# Patient Record
Sex: FEMALE | Race: WHITE | NOT HISPANIC OR LATINO | Employment: UNEMPLOYED | URBAN - METROPOLITAN AREA
[De-identification: names, ages, dates, MRNs, and addresses within clinical notes are randomized per-mention and may not be internally consistent; named-entity substitution may affect disease eponyms.]

---

## 2020-02-20 LAB
ABO GROUP BLD: NORMAL
BLD GP AB SCN SERPL QL: NEGATIVE
HBV SURFACE AG SERPL QL IA: NEGATIVE
HIV 1+2 AB+HIV1 P24 AG SERPL QL IA: NON REACTIVE
RH BLD: POSITIVE
RUBV IGG SERPL IA-ACNC: NORMAL
TREPONEMA PALLIDUM IGG+IGM AB [PRESENCE] IN SERUM OR PLASMA BY IMMUNOASSAY: NON REACTIVE

## 2020-04-08 LAB — GLUCOSE 1H P CHAL SERPL-MCNC: 125 MG/DL

## 2020-05-18 LAB
C TRACH DNA GENITAL QL NAA+PROBE: NEGATIVE
N GONORRHOEA DNA GENITAL QL NAA+PROBE: NORMAL

## 2020-06-17 LAB — STREP GP B DNA PCR: NEGATIVE

## 2020-07-15 ENCOUNTER — HOSPITAL ENCOUNTER (OUTPATIENT)
Facility: MEDICAL CENTER | Age: 25
End: 2020-07-15
Attending: SPECIALIST | Admitting: SPECIALIST
Payer: OTHER MISCELLANEOUS

## 2020-07-15 LAB — COVID ORDER STATUS COVID19: NORMAL

## 2020-07-15 PROCEDURE — C9803 HOPD COVID-19 SPEC COLLECT: HCPCS | Performed by: SPECIALIST

## 2020-07-15 PROCEDURE — U0003 INFECTIOUS AGENT DETECTION BY NUCLEIC ACID (DNA OR RNA); SEVERE ACUTE RESPIRATORY SYNDROME CORONAVIRUS 2 (SARS-COV-2) (CORONAVIRUS DISEASE [COVID-19]), AMPLIFIED PROBE TECHNIQUE, MAKING USE OF HIGH THROUGHPUT TECHNOLOGIES AS DESCRIBED BY CMS-2020-01-R: HCPCS

## 2020-07-15 NOTE — PROGRESS NOTES
Pt here for covid screen. Test performed. Pt given self isolating discharge instructions, verbalizes understanding.

## 2020-07-16 LAB
SARS-COV-2 RNA RESP QL NAA+PROBE: NOTDETECTED
SPECIMEN SOURCE: NORMAL

## 2020-07-17 ENCOUNTER — HOSPITAL ENCOUNTER (INPATIENT)
Facility: MEDICAL CENTER | Age: 25
LOS: 4 days | End: 2020-07-21
Attending: SPECIALIST | Admitting: SPECIALIST
Payer: OTHER MISCELLANEOUS

## 2020-07-17 ENCOUNTER — ANESTHESIA (OUTPATIENT)
Dept: OBGYN | Facility: MEDICAL CENTER | Age: 25
End: 2020-07-17
Payer: OTHER MISCELLANEOUS

## 2020-07-17 ENCOUNTER — ANESTHESIA EVENT (OUTPATIENT)
Dept: OBGYN | Facility: MEDICAL CENTER | Age: 25
End: 2020-07-17
Payer: OTHER MISCELLANEOUS

## 2020-07-17 ENCOUNTER — HOSPITAL ENCOUNTER (OUTPATIENT)
Dept: OBGYN | Facility: MEDICAL CENTER | Age: 25
End: 2020-07-17
Payer: OTHER MISCELLANEOUS

## 2020-07-17 DIAGNOSIS — G89.18 POST-OP PAIN: ICD-10-CM

## 2020-07-17 LAB
BASOPHILS # BLD AUTO: 0.5 % (ref 0–1.8)
BASOPHILS # BLD: 0.04 K/UL (ref 0–0.12)
EOSINOPHIL # BLD AUTO: 0.05 K/UL (ref 0–0.51)
EOSINOPHIL NFR BLD: 0.6 % (ref 0–6.9)
ERYTHROCYTE [DISTWIDTH] IN BLOOD BY AUTOMATED COUNT: 42.7 FL (ref 35.9–50)
HCT VFR BLD AUTO: 34.8 % (ref 37–47)
HGB BLD-MCNC: 11.6 G/DL (ref 12–16)
HOLDING TUBE BB 8507: NORMAL
IMM GRANULOCYTES # BLD AUTO: 0.11 K/UL (ref 0–0.11)
IMM GRANULOCYTES NFR BLD AUTO: 1.4 % (ref 0–0.9)
LYMPHOCYTES # BLD AUTO: 1.73 K/UL (ref 1–4.8)
LYMPHOCYTES NFR BLD: 22.4 % (ref 22–41)
MCH RBC QN AUTO: 29.5 PG (ref 27–33)
MCHC RBC AUTO-ENTMCNC: 33.3 G/DL (ref 33.6–35)
MCV RBC AUTO: 88.5 FL (ref 81.4–97.8)
MONOCYTES # BLD AUTO: 0.7 K/UL (ref 0–0.85)
MONOCYTES NFR BLD AUTO: 9.1 % (ref 0–13.4)
NEUTROPHILS # BLD AUTO: 5.09 K/UL (ref 2–7.15)
NEUTROPHILS NFR BLD: 66 % (ref 44–72)
NRBC # BLD AUTO: 0 K/UL
NRBC BLD-RTO: 0 /100 WBC
PLATELET # BLD AUTO: 164 K/UL (ref 164–446)
PMV BLD AUTO: 9.5 FL (ref 9–12.9)
RBC # BLD AUTO: 3.93 M/UL (ref 4.2–5.4)
WBC # BLD AUTO: 7.7 K/UL (ref 4.8–10.8)

## 2020-07-17 PROCEDURE — 700105 HCHG RX REV CODE 258: Performed by: SPECIALIST

## 2020-07-17 PROCEDURE — 700111 HCHG RX REV CODE 636 W/ 250 OVERRIDE (IP): Performed by: SPECIALIST

## 2020-07-17 PROCEDURE — 10907ZC DRAINAGE OF AMNIOTIC FLUID, THERAPEUTIC FROM PRODUCTS OF CONCEPTION, VIA NATURAL OR ARTIFICIAL OPENING: ICD-10-PCS | Performed by: SPECIALIST

## 2020-07-17 PROCEDURE — 85025 COMPLETE CBC W/AUTO DIFF WBC: CPT

## 2020-07-17 PROCEDURE — 36415 COLL VENOUS BLD VENIPUNCTURE: CPT

## 2020-07-17 PROCEDURE — 700102 HCHG RX REV CODE 250 W/ 637 OVERRIDE(OP): Performed by: SPECIALIST

## 2020-07-17 PROCEDURE — 3E0P7VZ INTRODUCTION OF HORMONE INTO FEMALE REPRODUCTIVE, VIA NATURAL OR ARTIFICIAL OPENING: ICD-10-PCS | Performed by: SPECIALIST

## 2020-07-17 PROCEDURE — 700105 HCHG RX REV CODE 258

## 2020-07-17 PROCEDURE — 700105 HCHG RX REV CODE 258: Performed by: ANESTHESIOLOGY

## 2020-07-17 PROCEDURE — A9270 NON-COVERED ITEM OR SERVICE: HCPCS | Performed by: SPECIALIST

## 2020-07-17 PROCEDURE — 700111 HCHG RX REV CODE 636 W/ 250 OVERRIDE (IP)

## 2020-07-17 PROCEDURE — 770002 HCHG ROOM/CARE - OB PRIVATE (112)

## 2020-07-17 PROCEDURE — 10H07YZ INSERTION OF OTHER DEVICE INTO PRODUCTS OF CONCEPTION, VIA NATURAL OR ARTIFICIAL OPENING: ICD-10-PCS | Performed by: SPECIALIST

## 2020-07-17 RX ORDER — SODIUM CHLORIDE, SODIUM LACTATE, POTASSIUM CHLORIDE, CALCIUM CHLORIDE 600; 310; 30; 20 MG/100ML; MG/100ML; MG/100ML; MG/100ML
INJECTION, SOLUTION INTRAVENOUS
Status: COMPLETED
Start: 2020-07-17 | End: 2020-07-17

## 2020-07-17 RX ORDER — ONDANSETRON 4 MG/1
4 TABLET, ORALLY DISINTEGRATING ORAL EVERY 6 HOURS PRN
Status: DISCONTINUED | OUTPATIENT
Start: 2020-07-17 | End: 2020-07-18

## 2020-07-17 RX ORDER — MISOPROSTOL 200 UG/1
800 TABLET ORAL
Status: DISCONTINUED | OUTPATIENT
Start: 2020-07-17 | End: 2020-07-18 | Stop reason: HOSPADM

## 2020-07-17 RX ORDER — DEXTROSE, SODIUM CHLORIDE, SODIUM LACTATE, POTASSIUM CHLORIDE, AND CALCIUM CHLORIDE 5; .6; .31; .03; .02 G/100ML; G/100ML; G/100ML; G/100ML; G/100ML
INJECTION, SOLUTION INTRAVENOUS CONTINUOUS
Status: DISCONTINUED | OUTPATIENT
Start: 2020-07-17 | End: 2020-07-19

## 2020-07-17 RX ORDER — ONDANSETRON 2 MG/ML
INJECTION INTRAMUSCULAR; INTRAVENOUS
Status: COMPLETED
Start: 2020-07-17 | End: 2020-07-17

## 2020-07-17 RX ORDER — ONDANSETRON 2 MG/ML
4 INJECTION INTRAMUSCULAR; INTRAVENOUS EVERY 6 HOURS PRN
Status: DISCONTINUED | OUTPATIENT
Start: 2020-07-17 | End: 2020-07-18

## 2020-07-17 RX ORDER — SODIUM CHLORIDE, SODIUM LACTATE, POTASSIUM CHLORIDE, CALCIUM CHLORIDE 600; 310; 30; 20 MG/100ML; MG/100ML; MG/100ML; MG/100ML
INJECTION, SOLUTION INTRAVENOUS CONTINUOUS
Status: DISPENSED | OUTPATIENT
Start: 2020-07-17 | End: 2020-07-17

## 2020-07-17 RX ORDER — ROPIVACAINE HYDROCHLORIDE 2 MG/ML
INJECTION, SOLUTION EPIDURAL; INFILTRATION; PERINEURAL
Status: COMPLETED
Start: 2020-07-17 | End: 2020-07-17

## 2020-07-17 RX ORDER — OXYCODONE HYDROCHLORIDE AND ACETAMINOPHEN 5; 325 MG/1; MG/1
1 TABLET ORAL EVERY 4 HOURS PRN
Status: DISCONTINUED | OUTPATIENT
Start: 2020-07-17 | End: 2020-07-18

## 2020-07-17 RX ORDER — SODIUM CHLORIDE, SODIUM LACTATE, POTASSIUM CHLORIDE, CALCIUM CHLORIDE 600; 310; 30; 20 MG/100ML; MG/100ML; MG/100ML; MG/100ML
1000 INJECTION, SOLUTION INTRAVENOUS CONTINUOUS
Status: DISCONTINUED | OUTPATIENT
Start: 2020-07-17 | End: 2020-07-21 | Stop reason: HOSPADM

## 2020-07-17 RX ORDER — IBUPROFEN 600 MG/1
600 TABLET ORAL EVERY 6 HOURS PRN
Status: DISCONTINUED | OUTPATIENT
Start: 2020-07-17 | End: 2020-07-18

## 2020-07-17 RX ADMIN — MISOPROSTOL 25 MCG: 100 TABLET ORAL at 10:10

## 2020-07-17 RX ADMIN — SODIUM CHLORIDE, POTASSIUM CHLORIDE, SODIUM LACTATE AND CALCIUM CHLORIDE: 600; 310; 30; 20 INJECTION, SOLUTION INTRAVENOUS at 14:58

## 2020-07-17 RX ADMIN — FENTANYL CITRATE 100 MCG: 50 INJECTION INTRAMUSCULAR; INTRAVENOUS at 17:16

## 2020-07-17 RX ADMIN — SODIUM CHLORIDE, POTASSIUM CHLORIDE, SODIUM LACTATE AND CALCIUM CHLORIDE: 600; 310; 30; 20 INJECTION, SOLUTION INTRAVENOUS at 17:17

## 2020-07-17 RX ADMIN — ROPIVACAINE HYDROCHLORIDE 200 MG: 2 INJECTION, SOLUTION EPIDURAL; INFILTRATION at 18:05

## 2020-07-17 RX ADMIN — SODIUM CHLORIDE, POTASSIUM CHLORIDE, SODIUM LACTATE AND CALCIUM CHLORIDE 1000 ML: 600; 310; 30; 20 INJECTION, SOLUTION INTRAVENOUS at 19:14

## 2020-07-17 RX ADMIN — SODIUM CHLORIDE, SODIUM GLUCONATE, SODIUM ACETATE, POTASSIUM CHLORIDE AND MAGNESIUM CHLORIDE: 526; 502; 368; 37; 30 INJECTION, SOLUTION INTRAVENOUS at 17:56

## 2020-07-17 RX ADMIN — OXYTOCIN 1 MILLI-UNITS/MIN: 10 INJECTION, SOLUTION INTRAMUSCULAR; INTRAVENOUS at 21:47

## 2020-07-17 SDOH — ECONOMIC STABILITY: FOOD INSECURITY: WITHIN THE PAST 12 MONTHS, YOU WORRIED THAT YOUR FOOD WOULD RUN OUT BEFORE YOU GOT MONEY TO BUY MORE.: PATIENT DECLINED

## 2020-07-17 SDOH — ECONOMIC STABILITY: TRANSPORTATION INSECURITY
IN THE PAST 12 MONTHS, HAS LACK OF TRANSPORTATION KEPT YOU FROM MEETINGS, WORK, OR FROM GETTING THINGS NEEDED FOR DAILY LIVING?: PATIENT DECLINED

## 2020-07-17 SDOH — ECONOMIC STABILITY: FOOD INSECURITY: WITHIN THE PAST 12 MONTHS, THE FOOD YOU BOUGHT JUST DIDN'T LAST AND YOU DIDN'T HAVE MONEY TO GET MORE.: PATIENT DECLINED

## 2020-07-17 SDOH — ECONOMIC STABILITY: TRANSPORTATION INSECURITY
IN THE PAST 12 MONTHS, HAS THE LACK OF TRANSPORTATION KEPT YOU FROM MEDICAL APPOINTMENTS OR FROM GETTING MEDICATIONS?: PATIENT DECLINED

## 2020-07-17 ASSESSMENT — LIFESTYLE VARIABLES
EVER_SMOKED: NEVER
AVERAGE NUMBER OF DAYS PER WEEK YOU HAVE A DRINK CONTAINING ALCOHOL: 0
TOTAL SCORE: 0
ALCOHOL_USE: NO
HOW MANY TIMES IN THE PAST YEAR HAVE YOU HAD 5 OR MORE DRINKS IN A DAY: 0
EVER FELT BAD OR GUILTY ABOUT YOUR DRINKING: NO
HAVE PEOPLE ANNOYED YOU BY CRITICIZING YOUR DRINKING: NO
HAVE YOU EVER FELT YOU SHOULD CUT DOWN ON YOUR DRINKING: NO
DOES PATIENT WANT TO STOP DRINKING: NO
CONSUMPTION TOTAL: NEGATIVE
EVER HAD A DRINK FIRST THING IN THE MORNING TO STEADY YOUR NERVES TO GET RID OF A HANGOVER: NO
TOTAL SCORE: 0
ON A TYPICAL DAY WHEN YOU DRINK ALCOHOL HOW MANY DRINKS DO YOU HAVE: 0
TOTAL SCORE: 0

## 2020-07-17 ASSESSMENT — PATIENT HEALTH QUESTIONNAIRE - PHQ9
1. LITTLE INTEREST OR PLEASURE IN DOING THINGS: NOT AT ALL
SUM OF ALL RESPONSES TO PHQ9 QUESTIONS 1 AND 2: 0
2. FEELING DOWN, DEPRESSED, IRRITABLE, OR HOPELESS: NOT AT ALL

## 2020-07-17 NOTE — PROGRESS NOTES
Komal is a very pleasant 24-year old nullipara ( 1, para 0) who is today 41 weeks and 1 day gestation.  She has had her prenatal care with myself during the course of this pregnancy.  She transferred her prenatal care to myself shortly after she moved here from [place], at which time she was about 25 weeks gestation.  I last saw her when she came to the office 2 days ago, namely on Wednesday, July 15, 2020.  At that time her cervix was found to be long and closed.  Of note, her recent vaginal culture for group B strep came back negative for group B strep.  Also of note, the estimated fetal weight is 3800 g.  She was scheduled for induction of labor this morning at 8:00 in the morning.  She presented to labor and delivery this morning at about 8:00 in the morning and cervical exam at that time revealed that her cervix was long and closed.  She received a dose of Cytotec 25 mcg per vagina at about 1015 this morning.  Within a few hours she began having uterine contractions.  I just checked her cervix and found her cervix to be about 2 cm dilated, 80% effaced, and the station is -1 station.  The fetal heart tracing is category 1.  We will continue electronic fetal monitoring and give analgesia as needed and anticipate an obstetrical delivery.  The patient says that she would perhaps be open to having a labor epidural.  I explained to her that if she does opt for a labor epidural I can then at that time after she has her labor epidural recheck her cervix and perform artificial rupture of membranes.  Gareth Bolden MD

## 2020-07-17 NOTE — PROGRESS NOTES
" EDC - 7/10 EGA - 41    0803 - Pt arrived to labor and delivery for scheduled IOL. Pt placed in room S 221.  0828 - External monitors in place X2. Category I FHT at this time. VSS. Pt reports good FM. No complaints of contractions, ROM or vaginal bleeding. SVE /-1. FOB \"Javy\" at bedside. POC discussed with pt and family members, all questions answered. Dr Bolden updated, orders received.   945 - Admission profile complete. IV started, labs sent.   1015 - cytotec placed per MAR order.   1145 - Dr Bolden updated. Pt may eat lunch  1445 - SVE /1. Pt repositioned to right side due to FHTs, pt educated regarding positioning r/t well being of baby, as FHTs respond positively to ride side lying position. Pt expresses understanding.   1550 - Dr Bolden at bedside. SVE /1. Continue with POC   - Dr Lazcano at bedside to place epidural.    - Test dose given, pt tolerated well. Pt positioned on right side.    - Report to ASHISH Mcghee RN. Dr Bolden at bedside.     "

## 2020-07-17 NOTE — CARE PLAN
Problem: Infection  Goal: Will remain free from infection  Outcome: PROGRESSING AS EXPECTED  Note: VSS, no s/s of infection, afebrile     Problem: Knowledge Deficit  Goal: Knowledge of the prescribed therapeutic regimen will improve  Outcome: PROGRESSING AS EXPECTED  Note: POC discussed, questions answered.

## 2020-07-18 LAB
ERYTHROCYTE [DISTWIDTH] IN BLOOD BY AUTOMATED COUNT: 43.8 FL (ref 35.9–50)
HCT VFR BLD AUTO: 28 % (ref 37–47)
HGB BLD-MCNC: 9.3 G/DL (ref 12–16)
MCH RBC QN AUTO: 30 PG (ref 27–33)
MCHC RBC AUTO-ENTMCNC: 33.2 G/DL (ref 33.6–35)
MCV RBC AUTO: 90.3 FL (ref 81.4–97.8)
PLATELET # BLD AUTO: 134 K/UL (ref 164–446)
PMV BLD AUTO: 9.3 FL (ref 9–12.9)
RBC # BLD AUTO: 3.1 M/UL (ref 4.2–5.4)
WBC # BLD AUTO: 13.7 K/UL (ref 4.8–10.8)

## 2020-07-18 PROCEDURE — 85027 COMPLETE CBC AUTOMATED: CPT

## 2020-07-18 PROCEDURE — 700105 HCHG RX REV CODE 258: Performed by: SPECIALIST

## 2020-07-18 PROCEDURE — 160041 HCHG SURGERY MINUTES - EA ADDL 1 MIN LEVEL 4: Performed by: SPECIALIST

## 2020-07-18 PROCEDURE — A4450 NON-WATERPROOF TAPE: HCPCS | Performed by: SPECIALIST

## 2020-07-18 PROCEDURE — 160002 HCHG RECOVERY MINUTES (STAT): Performed by: SPECIALIST

## 2020-07-18 PROCEDURE — 700112 HCHG RX REV CODE 229: Performed by: SPECIALIST

## 2020-07-18 PROCEDURE — 770002 HCHG ROOM/CARE - OB PRIVATE (112)

## 2020-07-18 PROCEDURE — A9270 NON-COVERED ITEM OR SERVICE: HCPCS | Performed by: SPECIALIST

## 2020-07-18 PROCEDURE — 700102 HCHG RX REV CODE 250 W/ 637 OVERRIDE(OP): Performed by: ANESTHESIOLOGY

## 2020-07-18 PROCEDURE — 160035 HCHG PACU - 1ST 60 MINS PHASE I: Performed by: SPECIALIST

## 2020-07-18 PROCEDURE — A9270 NON-COVERED ITEM OR SERVICE: HCPCS | Performed by: ANESTHESIOLOGY

## 2020-07-18 PROCEDURE — 700111 HCHG RX REV CODE 636 W/ 250 OVERRIDE (IP): Performed by: ANESTHESIOLOGY

## 2020-07-18 PROCEDURE — 700102 HCHG RX REV CODE 250 W/ 637 OVERRIDE(OP)

## 2020-07-18 PROCEDURE — 160029 HCHG SURGERY MINUTES - 1ST 30 MINS LEVEL 4: Performed by: SPECIALIST

## 2020-07-18 PROCEDURE — 160048 HCHG OR STATISTICAL LEVEL 1-5: Performed by: SPECIALIST

## 2020-07-18 PROCEDURE — 700111 HCHG RX REV CODE 636 W/ 250 OVERRIDE (IP): Performed by: SPECIALIST

## 2020-07-18 PROCEDURE — 700111 HCHG RX REV CODE 636 W/ 250 OVERRIDE (IP)

## 2020-07-18 PROCEDURE — 700101 HCHG RX REV CODE 250: Performed by: ANESTHESIOLOGY

## 2020-07-18 PROCEDURE — 36415 COLL VENOUS BLD VENIPUNCTURE: CPT

## 2020-07-18 PROCEDURE — 306288 HCHG RETRACTOR C SECTION LG

## 2020-07-18 PROCEDURE — 303615 HCHG EPIDURAL/SPINAL ANESTHESIA FOR LABOR

## 2020-07-18 PROCEDURE — A9270 NON-COVERED ITEM OR SERVICE: HCPCS

## 2020-07-18 PROCEDURE — 160009 HCHG ANES TIME/MIN: Performed by: SPECIALIST

## 2020-07-18 RX ORDER — KETOROLAC TROMETHAMINE 30 MG/ML
30 INJECTION, SOLUTION INTRAMUSCULAR; INTRAVENOUS EVERY 6 HOURS
Status: CANCELLED | OUTPATIENT
Start: 2020-07-18 | End: 2020-07-19

## 2020-07-18 RX ORDER — LIDOCAINE HYDROCHLORIDE 20 MG/ML
INJECTION, SOLUTION EPIDURAL; INFILTRATION; INTRACAUDAL; PERINEURAL PRN
Status: DISCONTINUED | OUTPATIENT
Start: 2020-07-18 | End: 2020-07-18 | Stop reason: SURG

## 2020-07-18 RX ORDER — HYDROMORPHONE HYDROCHLORIDE 1 MG/ML
0.4 INJECTION, SOLUTION INTRAMUSCULAR; INTRAVENOUS; SUBCUTANEOUS
Status: DISCONTINUED | OUTPATIENT
Start: 2020-07-18 | End: 2020-07-18 | Stop reason: HOSPADM

## 2020-07-18 RX ORDER — OXYCODONE HYDROCHLORIDE 5 MG/1
5 TABLET ORAL EVERY 4 HOURS PRN
Status: DISCONTINUED | OUTPATIENT
Start: 2020-07-18 | End: 2020-07-19

## 2020-07-18 RX ORDER — DIPHENHYDRAMINE HYDROCHLORIDE 50 MG/ML
12.5 INJECTION INTRAMUSCULAR; INTRAVENOUS
Status: DISCONTINUED | OUTPATIENT
Start: 2020-07-18 | End: 2020-07-18 | Stop reason: HOSPADM

## 2020-07-18 RX ORDER — ONDANSETRON 2 MG/ML
4 INJECTION INTRAMUSCULAR; INTRAVENOUS EVERY 6 HOURS PRN
Status: DISCONTINUED | OUTPATIENT
Start: 2020-07-18 | End: 2020-07-19

## 2020-07-18 RX ORDER — OXYCODONE HCL 5 MG/5 ML
10 SOLUTION, ORAL ORAL
Status: COMPLETED | OUTPATIENT
Start: 2020-07-18 | End: 2020-07-18

## 2020-07-18 RX ORDER — MIDAZOLAM HYDROCHLORIDE 1 MG/ML
1 INJECTION INTRAMUSCULAR; INTRAVENOUS
Status: DISCONTINUED | OUTPATIENT
Start: 2020-07-18 | End: 2020-07-18 | Stop reason: HOSPADM

## 2020-07-18 RX ORDER — CITRIC ACID/SODIUM CITRATE 334-500MG
SOLUTION, ORAL ORAL
Status: COMPLETED
Start: 2020-07-18 | End: 2020-07-18

## 2020-07-18 RX ORDER — ROCURONIUM BROMIDE 10 MG/ML
INJECTION, SOLUTION INTRAVENOUS PRN
Status: DISCONTINUED | OUTPATIENT
Start: 2020-07-18 | End: 2020-07-18 | Stop reason: SURG

## 2020-07-18 RX ORDER — DOCUSATE SODIUM 100 MG/1
100 CAPSULE, LIQUID FILLED ORAL 2 TIMES DAILY PRN
Status: DISCONTINUED | OUTPATIENT
Start: 2020-07-18 | End: 2020-07-21 | Stop reason: HOSPADM

## 2020-07-18 RX ORDER — SODIUM CHLORIDE, SODIUM GLUCONATE, SODIUM ACETATE, POTASSIUM CHLORIDE AND MAGNESIUM CHLORIDE 526; 502; 368; 37; 30 MG/100ML; MG/100ML; MG/100ML; MG/100ML; MG/100ML
INJECTION, SOLUTION INTRAVENOUS
Status: DISCONTINUED | OUTPATIENT
Start: 2020-07-17 | End: 2020-07-18 | Stop reason: SURG

## 2020-07-18 RX ORDER — CEFAZOLIN SODIUM 1 G/3ML
INJECTION, POWDER, FOR SOLUTION INTRAMUSCULAR; INTRAVENOUS PRN
Status: DISCONTINUED | OUTPATIENT
Start: 2020-07-18 | End: 2020-07-18 | Stop reason: SURG

## 2020-07-18 RX ORDER — ROPIVACAINE HYDROCHLORIDE 2 MG/ML
INJECTION, SOLUTION EPIDURAL; INFILTRATION; PERINEURAL
Status: COMPLETED
Start: 2020-07-18 | End: 2020-07-18

## 2020-07-18 RX ORDER — HALOPERIDOL 5 MG/ML
1 INJECTION INTRAMUSCULAR
Status: DISCONTINUED | OUTPATIENT
Start: 2020-07-18 | End: 2020-07-18 | Stop reason: HOSPADM

## 2020-07-18 RX ORDER — HYDROMORPHONE HYDROCHLORIDE 1 MG/ML
0.2 INJECTION, SOLUTION INTRAMUSCULAR; INTRAVENOUS; SUBCUTANEOUS
Status: DISCONTINUED | OUTPATIENT
Start: 2020-07-18 | End: 2020-07-18 | Stop reason: HOSPADM

## 2020-07-18 RX ORDER — ONDANSETRON 2 MG/ML
INJECTION INTRAMUSCULAR; INTRAVENOUS PRN
Status: DISCONTINUED | OUTPATIENT
Start: 2020-07-18 | End: 2020-07-18 | Stop reason: SURG

## 2020-07-18 RX ORDER — MEPERIDINE HYDROCHLORIDE 25 MG/ML
12.5 INJECTION INTRAMUSCULAR; INTRAVENOUS; SUBCUTANEOUS
Status: DISCONTINUED | OUTPATIENT
Start: 2020-07-18 | End: 2020-07-18 | Stop reason: HOSPADM

## 2020-07-18 RX ORDER — SODIUM CHLORIDE, SODIUM LACTATE, POTASSIUM CHLORIDE, CALCIUM CHLORIDE 600; 310; 30; 20 MG/100ML; MG/100ML; MG/100ML; MG/100ML
INJECTION, SOLUTION INTRAVENOUS CONTINUOUS
Status: DISCONTINUED | OUTPATIENT
Start: 2020-07-18 | End: 2020-07-21 | Stop reason: HOSPADM

## 2020-07-18 RX ORDER — DIPHENHYDRAMINE HYDROCHLORIDE 50 MG/ML
25 INJECTION INTRAMUSCULAR; INTRAVENOUS EVERY 6 HOURS PRN
Status: DISCONTINUED | OUTPATIENT
Start: 2020-07-18 | End: 2020-07-19

## 2020-07-18 RX ORDER — HYDROMORPHONE HYDROCHLORIDE 1 MG/ML
0.2 INJECTION, SOLUTION INTRAMUSCULAR; INTRAVENOUS; SUBCUTANEOUS
Status: DISCONTINUED | OUTPATIENT
Start: 2020-07-18 | End: 2020-07-19

## 2020-07-18 RX ORDER — HYDRALAZINE HYDROCHLORIDE 20 MG/ML
5 INJECTION INTRAMUSCULAR; INTRAVENOUS
Status: DISCONTINUED | OUTPATIENT
Start: 2020-07-18 | End: 2020-07-18 | Stop reason: HOSPADM

## 2020-07-18 RX ORDER — ACETAMINOPHEN 500 MG
1000 TABLET ORAL EVERY 6 HOURS
Status: COMPLETED | OUTPATIENT
Start: 2020-07-18 | End: 2020-07-19

## 2020-07-18 RX ORDER — OXYCODONE HYDROCHLORIDE 10 MG/1
10 TABLET ORAL EVERY 4 HOURS PRN
Status: DISCONTINUED | OUTPATIENT
Start: 2020-07-18 | End: 2020-07-19

## 2020-07-18 RX ORDER — METHYLERGONOVINE MALEATE 0.2 MG/ML
0.2 INJECTION INTRAVENOUS
Status: DISCONTINUED | OUTPATIENT
Start: 2020-07-18 | End: 2020-07-21 | Stop reason: HOSPADM

## 2020-07-18 RX ORDER — SIMETHICONE 80 MG
80 TABLET,CHEWABLE ORAL 4 TIMES DAILY PRN
Status: DISCONTINUED | OUTPATIENT
Start: 2020-07-18 | End: 2020-07-21 | Stop reason: HOSPADM

## 2020-07-18 RX ORDER — LIDOCAINE HYDROCHLORIDE 10 MG/ML
INJECTION, SOLUTION INFILTRATION; PERINEURAL
Status: COMPLETED
Start: 2020-07-18 | End: 2020-07-18

## 2020-07-18 RX ORDER — OXYCODONE HCL 5 MG/5 ML
SOLUTION, ORAL ORAL
Status: DISPENSED
Start: 2020-07-18 | End: 2020-07-18

## 2020-07-18 RX ORDER — HYDROMORPHONE HYDROCHLORIDE 1 MG/ML
0.4 INJECTION, SOLUTION INTRAMUSCULAR; INTRAVENOUS; SUBCUTANEOUS
Status: DISCONTINUED | OUTPATIENT
Start: 2020-07-18 | End: 2020-07-19

## 2020-07-18 RX ORDER — KETOROLAC TROMETHAMINE 30 MG/ML
30 INJECTION, SOLUTION INTRAMUSCULAR; INTRAVENOUS EVERY 6 HOURS
Status: DISPENSED | OUTPATIENT
Start: 2020-07-18 | End: 2020-07-19

## 2020-07-18 RX ORDER — DIPHENHYDRAMINE HYDROCHLORIDE 50 MG/ML
12.5 INJECTION INTRAMUSCULAR; INTRAVENOUS EVERY 6 HOURS PRN
Status: DISCONTINUED | OUTPATIENT
Start: 2020-07-18 | End: 2020-07-19

## 2020-07-18 RX ORDER — SODIUM CHLORIDE, SODIUM LACTATE, POTASSIUM CHLORIDE, CALCIUM CHLORIDE 600; 310; 30; 20 MG/100ML; MG/100ML; MG/100ML; MG/100ML
INJECTION, SOLUTION INTRAVENOUS PRN
Status: DISCONTINUED | OUTPATIENT
Start: 2020-07-18 | End: 2020-07-21 | Stop reason: HOSPADM

## 2020-07-18 RX ORDER — METOCLOPRAMIDE HYDROCHLORIDE 5 MG/ML
INJECTION INTRAMUSCULAR; INTRAVENOUS
Status: COMPLETED
Start: 2020-07-18 | End: 2020-07-18

## 2020-07-18 RX ORDER — ONDANSETRON 2 MG/ML
4 INJECTION INTRAMUSCULAR; INTRAVENOUS
Status: DISCONTINUED | OUTPATIENT
Start: 2020-07-18 | End: 2020-07-18 | Stop reason: HOSPADM

## 2020-07-18 RX ORDER — OXYCODONE HCL 5 MG/5 ML
5 SOLUTION, ORAL ORAL
Status: COMPLETED | OUTPATIENT
Start: 2020-07-18 | End: 2020-07-18

## 2020-07-18 RX ORDER — CHLOROPROCAINE HYDROCHLORIDE 30 MG/ML
INJECTION, SOLUTION EPIDURAL; INFILTRATION; INTRACAUDAL; PERINEURAL PRN
Status: DISCONTINUED | OUTPATIENT
Start: 2020-07-18 | End: 2020-07-18 | Stop reason: SURG

## 2020-07-18 RX ORDER — KETOROLAC TROMETHAMINE 30 MG/ML
INJECTION, SOLUTION INTRAMUSCULAR; INTRAVENOUS PRN
Status: DISCONTINUED | OUTPATIENT
Start: 2020-07-18 | End: 2020-07-18 | Stop reason: SURG

## 2020-07-18 RX ORDER — HYDROMORPHONE HYDROCHLORIDE 1 MG/ML
0.1 INJECTION, SOLUTION INTRAMUSCULAR; INTRAVENOUS; SUBCUTANEOUS
Status: DISCONTINUED | OUTPATIENT
Start: 2020-07-18 | End: 2020-07-18 | Stop reason: HOSPADM

## 2020-07-18 RX ORDER — MORPHINE SULFATE 0.5 MG/ML
INJECTION, SOLUTION EPIDURAL; INTRATHECAL; INTRAVENOUS PRN
Status: DISCONTINUED | OUTPATIENT
Start: 2020-07-18 | End: 2020-07-18 | Stop reason: SURG

## 2020-07-18 RX ADMIN — ONDANSETRON 4 MG: 2 INJECTION INTRAMUSCULAR; INTRAVENOUS at 11:01

## 2020-07-18 RX ADMIN — MORPHINE SULFATE 1.5 MG: 0.5 INJECTION, SOLUTION EPIDURAL; INTRATHECAL; INTRAVENOUS at 10:25

## 2020-07-18 RX ADMIN — OXYTOCIN 20 ML: 10 INJECTION, SOLUTION INTRAMUSCULAR; INTRAVENOUS at 10:17

## 2020-07-18 RX ADMIN — ROPIVACAINE HYDROCHLORIDE 200 MG: 2 INJECTION, SOLUTION EPIDURAL; INFILTRATION at 08:18

## 2020-07-18 RX ADMIN — ROPIVACAINE HYDROCHLORIDE 200 MG: 2 INJECTION, SOLUTION EPIDURAL; INFILTRATION at 02:06

## 2020-07-18 RX ADMIN — KETOROLAC TROMETHAMINE 30 MG: 30 INJECTION, SOLUTION INTRAMUSCULAR at 21:10

## 2020-07-18 RX ADMIN — CHLOROPROCAINE HYDROCHLORIDE 10 ML: 30 INJECTION, SOLUTION EPIDURAL; INFILTRATION; INTRACAUDAL; PERINEURAL at 09:47

## 2020-07-18 RX ADMIN — PROPOFOL 170 MG: 10 INJECTION, EMULSION INTRAVENOUS at 10:13

## 2020-07-18 RX ADMIN — SODIUM CITRATE AND CITRIC ACID MONOHYDRATE 30 ML: 500; 334 SOLUTION ORAL at 09:29

## 2020-07-18 RX ADMIN — ACETAMINOPHEN 1000 MG: 500 TABLET ORAL at 21:10

## 2020-07-18 RX ADMIN — SUGAMMADEX 200 MG: 100 INJECTION, SOLUTION INTRAVENOUS at 11:09

## 2020-07-18 RX ADMIN — ONDANSETRON 4 MG: 2 INJECTION INTRAMUSCULAR; INTRAVENOUS at 02:56

## 2020-07-18 RX ADMIN — CEFAZOLIN 2.7 G: 330 INJECTION, POWDER, FOR SOLUTION INTRAMUSCULAR; INTRAVENOUS at 10:09

## 2020-07-18 RX ADMIN — SODIUM CHLORIDE, SODIUM LACTATE, POTASSIUM CHLORIDE, CALCIUM CHLORIDE AND DEXTROSE MONOHYDRATE: 5; 600; 310; 30; 20 INJECTION, SOLUTION INTRAVENOUS at 03:21

## 2020-07-18 RX ADMIN — CHLOROPROCAINE HYDROCHLORIDE 3 ML: 30 INJECTION, SOLUTION EPIDURAL; INFILTRATION; INTRACAUDAL; PERINEURAL at 10:00

## 2020-07-18 RX ADMIN — KETOROLAC TROMETHAMINE 30 MG: 30 INJECTION, SOLUTION INTRAMUSCULAR at 15:49

## 2020-07-18 RX ADMIN — OXYTOCIN 1000 ML: 10 INJECTION, SOLUTION INTRAMUSCULAR; INTRAVENOUS at 11:06

## 2020-07-18 RX ADMIN — ROCURONIUM BROMIDE 50 MG: 10 INJECTION, SOLUTION INTRAVENOUS at 10:13

## 2020-07-18 RX ADMIN — SODIUM CHLORIDE, POTASSIUM CHLORIDE, SODIUM LACTATE AND CALCIUM CHLORIDE 1000 ML: 600; 310; 30; 20 INJECTION, SOLUTION INTRAVENOUS at 07:00

## 2020-07-18 RX ADMIN — FAMOTIDINE 20 MG: 10 INJECTION, SOLUTION INTRAVENOUS at 09:30

## 2020-07-18 RX ADMIN — OXYCODONE HYDROCHLORIDE 10 MG: 5 SOLUTION ORAL at 11:41

## 2020-07-18 RX ADMIN — LIDOCAINE HYDROCHLORIDE 40 MG: 20 INJECTION, SOLUTION EPIDURAL; INFILTRATION; INTRACAUDAL at 10:13

## 2020-07-18 RX ADMIN — ACETAMINOPHEN 1000 MG: 500 TABLET ORAL at 15:49

## 2020-07-18 RX ADMIN — KETOROLAC TROMETHAMINE 30 MG: 30 INJECTION, SOLUTION INTRAMUSCULAR at 10:36

## 2020-07-18 RX ADMIN — CHLOROPROCAINE HYDROCHLORIDE 5 ML: 30 INJECTION, SOLUTION EPIDURAL; INFILTRATION; INTRACAUDAL; PERINEURAL at 09:53

## 2020-07-18 RX ADMIN — METOCLOPRAMIDE 10 MG: 5 INJECTION, SOLUTION INTRAMUSCULAR; INTRAVENOUS at 09:29

## 2020-07-18 RX ADMIN — DOCUSATE SODIUM 100 MG: 100 CAPSULE, LIQUID FILLED ORAL at 15:49

## 2020-07-18 ASSESSMENT — PAIN SCALES - GENERAL: PAIN_LEVEL: 0

## 2020-07-18 ASSESSMENT — EDINBURGH POSTNATAL DEPRESSION SCALE (EPDS)
THE THOUGHT OF HARMING MYSELF HAS OCCURRED TO ME: NEVER
I HAVE BEEN SO UNHAPPY THAT I HAVE HAD DIFFICULTY SLEEPING: NOT AT ALL
I HAVE LOOKED FORWARD WITH ENJOYMENT TO THINGS: AS MUCH AS I EVER DID
I HAVE FELT SAD OR MISERABLE: NOT VERY OFTEN
I HAVE BEEN ABLE TO LAUGH AND SEE THE FUNNY SIDE OF THINGS: AS MUCH AS I ALWAYS COULD
I HAVE BEEN ANXIOUS OR WORRIED FOR NO GOOD REASON: HARDLY EVER
I HAVE BEEN SO UNHAPPY THAT I HAVE BEEN CRYING: ONLY OCCASIONALLY
I HAVE FELT SCARED OR PANICKY FOR NO GOOD REASON: NO, NOT MUCH
THINGS HAVE BEEN GETTING ON TOP OF ME: YES, SOMETIMES I HAVEN'T BEEN COPING AS WELL AS USUAL
I HAVE BLAMED MYSELF UNNECESSARILY WHEN THINGS WENT WRONG: NO, NEVER

## 2020-07-18 NOTE — ANESTHESIA QCDR
2019 [facility] Clinical Data Registry (for Quality Improvement)     Postoperative nausea/vomiting risk protocol (Adult = 18 yrs and Pediatric 3-17 yrs)- (430 and 463)  General inhalation anesthetic (NOT TIVA) with PONV risk factors: No  Provision of anti-emetic therapy with at least 2 different classes of agents: N/A  Patient DID NOT receive anti-emetic therapy and reason is documented in Medical Record: N/A    Multimodal Pain Management- (477)  Non-emergent surgery AND patient age >= 18: No  Use of Multimodal Pain Management, two or more drugs and/or interventions, NOT including systemic opioids:   Exception: Documented allergy to multiple classes of analgesics:     Smoking Abstinence (404)  Patient is current smoker (cigarette, pipe, e-cig, marijuanna): No  Elective Surgery:   Abstinence instructions provided prior to day of surgery:   Patient abstained from smoking on day of surgery:     Pre-Op Beta-Blocker in Isolated CABG (44)  Isolated CABG AND patient age >= 18: No  Beta-blocker admin within 24 hours of surgical incision:   Exception:of medical reason(s) for not administering beta blocker within 24 hours prior to surgical incision (e.g., not  indicated,other medical reason):     PACU assessment of acute postoperative pain prior to Anesthesia Care End- Applies to Patients Age = 18- (ABG7)  Initial PACU pain score is which of the following: < 7/10  Patient unable to report pain score: N/A    Post-anesthetic transfer of care checklist/protocol to PACU/ICU- (426 and 427)  Upon conclusion of case, patient transferred to which of the following locations: PACU/Non-ICU  Use of transfer checklist/protocol: Yes  Exclusion: Service Performed in Patient Hospital Room (and thus did not require transfer): N/A  Unplanned admission to ICU related to anesthesia service up through end of PACU care- (MD51)  Unplanned admission to ICU (not initially anticipated at anesthesia start time): No

## 2020-07-18 NOTE — ANESTHESIA POSTPROCEDURE EVALUATION
Patient: Komal Bailey Ponalejandro    Procedure Summary     Date:  20 Room / Location:  [facility] OR 01 / LABOR AND DELIVERY    Anesthesia Start:   Anesthesia Stop:  20 112    Procedure:   SECTION, PRIMARY (Abdomen) Diagnosis:  (failure to descend)    Surgeon:  Gareth Bolden M.D. Responsible Provider:  Pablo Gray M.D.    Anesthesia Type:  epidural ASA Status:  2          Final Anesthesia Type: epidural  Last vitals  BP   Blood Pressure: 125/77    Temp   37.1 °C (98.8 °F)    Pulse   Pulse: 92   Resp   16    SpO2   97 %      Anesthesia Post Evaluation    Patient location during evaluation: floor  Patient participation: complete - patient participated  Level of consciousness: awake and alert  Pain score: 0    Airway patency: patent  Anesthetic complications: no  Cardiovascular status: hemodynamically stable  Respiratory status: acceptable  Hydration status: euvolemic    PONV: none

## 2020-07-18 NOTE — ANESTHESIA TIME REPORT
Anesthesia Start and Stop Event Times     Date Time Event    7/17/2020 1756 Ready for Procedure     1756 Anesthesia Start    7/18/2020 1121 Anesthesia Stop        Responsible Staff  07/17/20 to 07/18/20    Name Role Begin End    Mynor Lazcano M.D. Anesth 1756 0659    Pablo Gray M.D. Anesth 0659 1121        Preop Diagnosis (Free Text):  Pre-op Diagnosis     failure to descend        Preop Diagnosis (Codes):    Post op Diagnosis  Arrested active phase of labor      Premium Reason  E. Weekend    Comments:

## 2020-07-18 NOTE — PROGRESS NOTES
The patient just received her labor epidural, and her labor epidural is functioning well. I just checked her cervix and I found her cervix to be about 4 cm dilated and 70 percent effaced and -2 station. I then performed artificial rupture of membranes and clear amniotic fluid was observed. I then placed an intrauterine pressure catheter. The fetal heart tracing is category one.   Gareth Bolden M.D.

## 2020-07-18 NOTE — ANESTHESIA PROCEDURE NOTES
Epidural Block    Date/Time: 7/17/2020 5:56 PM  Performed by: Mynor Lazcano M.D.  Authorized by: Mynor Lazcano M.D.     Patient Location:  OB  Start Time:  7/17/2020 5:56 PM  Reason for Block: labor analgesia    patient identified, IV checked, site marked, risks and benefits discussed, surgical consent, monitors and equipment checked, pre-op evaluation and timeout performed    Patient Position:  Sitting  Prep: ChloraPrep, patient draped and sterile technique    Monitoring:  Blood pressure, continuous pulse oximetry and heart rate  Approach:  Midline  Location:  L2-L3  Injection Technique:  TIFFANY saline  Skin infiltration:  Lidocaine  Strength:  1%  Dose:  3ml  Needle Type:  Tuohy  Needle Gauge:  17 G  Needle Length:  3.5 in  Loss of resistance::  6  Catheter Size:  19 G  Catheter at Skin Depth:  12

## 2020-07-18 NOTE — H&P
DATE OF PROCEDURE:  2020    IDENTIFICATION:  The patient is a very pleasant 24-year-old nullipara (on   admission  1, para 0) who is today 41 weeks and 1 day gestation.    CHIEF COMPLAINT:  Frequent and painful uterine contractions.    HISTORY OF PRESENT ILLNESS:  The patient has had her prenatal care with myself   during the course of this pregnancy.  Her due date was established as   07/10/2020.  She was admitted to [facility] Labor and   Delivery yesterday morning at about 8:00 in the morning for induction of labor   for postdates.  The estimated fetal weight was 3800 g.  The recent vaginal   culture for group B strep came back negative for group B strep.  On admission   to labor and delivery, her cervix was found to be long and closed and high,   and she received a dose of Cytotec 25 mcg per vagina and within several hours,   she began having contractions and her cervix changed.  I checked her cervix   in the afternoon and found her cervix to be about 2 cm dilated and 80% effaced   and -2 station and the fetal heart tracing was category 1.  She subsequently   received labor epidural, which functioned well.  At about 7:00 p.m., it was   noted that she had just received a labor epidural and that her labor epidural   was functioning well, and I checked her cervix and found her cervix about 4 cm   dilated and 70% effaced and -2 station, and I then at that time, namely at   about 7:00 p.m. yesterday, performed artificial rupture of membranes and clear   amniotic fluid was observed and then I placed intrauterine pressure catheter.    She was started on Pitocin at one point in time.  The Pitocin was continued.    At about midnight, her cervix was approximately 5-6 cm dilated and 80%   effaced and -2 station.  At 10 minutes after 2:00 this morning, her cervix was   8 cm dilated, 90% effaced, and 0 station.  At 3:00 in the morning, her cervix   was still 8 cm dilated, 90% effaced, and 0  station.  At 4:30 this morning,   her cervix was between 9 and 10 cm dilated and completely effaced and 0   station.  By 5:50 in the morning, her cervix was found to be completely   dilated and the station was +1 (at about sometime between 5:00 to 6:00 this   morning).  At about 5:50 this morning, she began pushing with her   contractions.  She pushed for over 3 hours.  The station was right occiput   anterior.  After more than 3 hours of pushing/second stage, the decision was   made to try to expedite delivery with the use of the vacuum and I discussed   this with the patient and she agreed.  I explained to her that if I was not   able to accomplish vaginal delivery after 3 attempts with the vacuum that we   would need to proceed with  section and she agreed.  So, the vacuum   was placed at +2 to +3 station, right occiput anterior, secondary to prolonged   second stage.  Three attempts of ____ contractions were made with the vacuum,   but were not successful.  The vacuum was then removed.  I explained to the   patient that I would not recommend continuing to try to attempt vaginal   delivery with the vacuum after 3 tries and that I would recommend that we   proceed with  section and she agreed.  I discussed with the patient   and explained to the patient what a primary  section is, what a   primary  section involves, along with the risks and benefits and   alternatives, and after this discussion and after I answered her questions,   she said she wished for us to proceed with  section.  We are bringing   her to the operating room for primary  section.    PAST MEDICAL HISTORY:  No medical illnesses other than for a history of   anxiety and depression.    PAST SURGICAL HISTORY:  The patient had a tonsillectomy.    MEDICATIONS:  The patient has been on no medications other than for vitamins   and other than for the medication she has received during this    hospitalization.    ALLERGIES:  THE PATIENT SAYS SHE IS ALLERGIC TO BIRTH CONTROL PILLS and has no   other drug allergies.    SOCIAL HISTORY:  The patient denies smoking.  She denies use of alcohol or   illicit drugs.    REVIEW OF SYSTEMS:  GENERAL:  No fevers or chills or sweats.  PULMONARY:  No coughing or wheezing or chest pain or shortness of breath.  CARDIOVASCULAR:  No palpitations or dyspnea or chest pain.  GASTROINTESTINAL:  No nausea, vomiting, diarrhea, constipation.  GENITOURINARY:  The patient has been having frequent painful contractions and   she is feeling some of her contractions despite the epidural.  MUSCULOSKELETAL:  No arthralgias or myalgias other than for hip pain and low   back pain.  NEUROLOGIC:  No headaches or syncope or seizures.    PHYSICAL EXAMINATION:  VITAL SIGNS:  Patient's vital signs are stable and she is afebrile.  GENERAL:  Well-developed, well nourished, no apparent distress.  CHEST AND HEART:  Regular rate and rhythm.  No murmur.  LUNGS:  Clear to auscultation bilaterally.  ABDOMEN:  Gravid, about 9 months' size.  PELVIC:  Cervix is completely dilated and station is about +2 station now.  EXTREMITIES:  No clubbing, cyanosis, or edema other than for mild bilateral   symmetrical lower extremity edema consistent with term pregnancy.  NEUROLOGICAL:  Nonfocal.    ASSESSMENT:  1.  Intrauterine pregnancy at 41 weeks and 1 day gestation.  2.  Failed induction of labor.  3.  Prolonged second stage.  4.  Failed attempt to accomplish delivery with vacuum.    PLAN:  We will proceed with  section.  Please see above.       ____________________________________     Gareth Bolden MD    MED / NTS    DD:  2020 09:39:48  DT:  2020 10:17:21    D#:  [id number]  Job#:  [id number]

## 2020-07-18 NOTE — OR SURGEON
Immediate Post OP Note    PreOp Diagnosis:   1.)  Intrauterine pregnancy at 41 weeks and 1 day gestation.  2.)  Active labor, second stage of labor.  3.)  Prolonged second stage of labor (more than 3 hours of pushing).  4.)  Failed attempt to accomplish vaginal delivery with vacuum (failed attempted vacuum assisted vaginal delivery).  5.)  Failed induction of labor.    PostOp Diagnosis:   1.)  Intrauterine pregnancy at 41 weeks and 1 day gestation.  2.)  Active labor, second stage of labor.  3.)  Prolonged second stage of labor (more than 3 hours of pushing).  4.)  Failed attempt to accomplish vaginal delivery with vacuum (failed attempted vacuum assisted vaginal delivery).  5.)  Failed induction of labor.  6.)  Live term male  with Apgar scores of 7 and 8 at 1 and 5 minutes respectively and  weight of 3,670 grams    Procedure(s):   SECTION, PRIMARY    Surgeon(s):  LORE Anderson M.D.    Anesthesiologist/Type of Anesthesia:  Anesthesiologist: Mynor Lazcano M.D.; Pablo Gray M.D./continuous epidural anesthesia, converted to general endotracheal tube anesthesia.    Surgical Staff:  Circulator: Everton Pnea R.N.  Scrub Person: Elza RUDOLPH&ОЛЕГ Baby  Nurse: Koki Chavez R.N.    Specimens removed if any:  None    Estimated Blood Loss:   Approximately 600 cc.    Findings:   1.)  Live term male  with Apgar scores of 7 and 8 at 1 and 5 minutes respectively and  weight of 3670 grams    Complications:   None        2020 11:38 AM Gareth Bolden M.D.

## 2020-07-18 NOTE — OP REPORT
DATE OF SERVICE:  2020    PREOPERATIVE DIAGNOSES:  1.  Intrauterine pregnancy at 41 weeks and 1 day gestation.  2.  Active labor, second stage of labor.  3.  Prolonged second stage of labor (more than 3 hours of pushing).  4.  Failed attempt to accomplish vaginal delivery with vacuum (failed   attempted vacuum-assisted vaginal delivery).  5.  Failed induction of labor.    POSTOPERATIVE DIAGNOSES:  1.  Intrauterine pregnancy at 41 weeks and 1 day gestation.  2.  Active labor, second stage of labor.  3.  Prolonged second stage of labor (more than 3 hours of pushing).  4.  Failed attempt to accomplish vaginal delivery with vacuum (failed   attempted vacuum-assisted vaginal delivery).  5.  Failed induction of labor.  6.  Live term male  with Apgar scores of 7 and 8 at 1 and 5 minutes   respectively and a  weight of 3670 g.    PROCEDURE:  Primary low transverse  section.    SURGEON:  Gareth Bolden MD    ASSISTANT:  Elizabeth Harmon MD    ANESTHESIA:  Initially epidural, converted to general endotracheal tube   anesthesia.    ANESTHESIOLOGIST:  Pablo Gray MD    FINDINGS:  1.  Live term male  with Apgar scores of 7 and 8 at 1 and 5 minutes   respectively and a  weight of 3670 g.  2.  Normal uterus.    SPECIMENS:  None.    COMPLICATIONS:  None.    ESTIMATED BLOOD LOSS:  Approximately 600 mL.    DESCRIPTION OF PROCEDURE:  After the appropriate consents have been obtained,   the patient was taken to the operating room and given a bolus of local   anesthetic through her epidural catheter.  The patient was prepped and draped   in the dorsal supine position and a Novak catheter was noted to be in place   and draining urine.  Anesthesia was tested and the patient has adequate   anesthesia on the right side of her abdomen, but does not appear to have   anesthesia on the left side of the abdomen.  Despite redosing through her   epidural catheter, the patient continued to feel  sensation/pain on the left   side of her abdomen, but not on the right side of her abdomen.  Because it   appeared at this point that the epidural was one-sided, the decision was made   to proceed with general anesthesia and so general anesthesia was induced.    After general anesthesia was induced, a Pfannenstiel incision was made in the   lower abdomen, using a scalpel and this incision was made a few fingerbreadths   superior to the pubic symphysis.  This incision was continued deeply through   the subcutaneous tissues using a scalpel.  The anterior rectus fascia was   identified and incised transversely with a scalpel and this incision was   extended bilaterally with scissors.  The superior aspect of the fascial   incision was undermined from the underlying rectus muscle with blunt   dissection and scissors.  The inferior aspect of the fascial incision was   undermined from the underlying rectus muscle with blunt dissection and   scissors.  The midline of the rectus muscle was easily identified due to the   diastasis in the midline of the rectus muscle and this separation was   developed and continued superiorly and inferiorly and blunt dissection through   the preperitoneal adipose tissues allows identification of the parietal   peritoneum, which was entered bluntly and this entry was extended/expanded   with blunt dissection.  An Justus O self-retaining retractor was inserted and   setup in the usual fashion and found to provide excellent exposure of the   anterior lower uterine segment.  The anterior lower uterine segment was   incised transversely with a scalpel.  This incision was continued deeply with   blunt dissection with the 's fingers and the intrauterine cavity was   reached and meconium-stained amniotic fluid was noted.  The hysterotomy was   extended bilaterally with blunt dissection.  A hand was placed in the   intrauterine cavity and the 's hand (right hand) was then advanced    around baby's head.  Initially, difficulty was encountered, but with   persistence in the patient, the 's hand was eventually successfully   placed around baby's head and then baby's head was slowly elevated up and out   of the pelvis.  Baby's head was nicely elevated up and out of the pelvis and   then easily delivered through the hysterotomy.  Fundal pressure was used to   easily deliver baby's body.  Baby's nasopharynx was suctioned with a bulb   syringe.  After waiting 30 seconds, the umbilical cord was doubly clamped and   cut.  Baby was then handed to the waiting nurse.  A segment of umbilical cord   was set aside for possible cord gases, which were not obtained because the   Apgar scores were 7 and 8.  The placenta was manually delivered.  The interior   of the uterus was wiped clean of products of conception.  The hysterotomy was   reapproximated with a continuous interlocking suture of #1 chromic and then a   continuous simple suture of #1 chromic was placed over the first layer so   again using #1 chromic, to imbricate the first layer.  A few interrupted   figure-of-eight sutures of #1 chromic were placed in certain sites along the   hysterotomy repair to achieve hemostasis.  The entire length of the   hysterotomy repair was examined and hemostasis was noted to be excellent.  The   Justus O self-retaining retractor was removed and lap sponges were removed   from the peritoneal cavity.  Lap count was reported to be correct at this   time.  The parietal peritoneum is identified and reapproximated with simple   continuous sutures using 3-0 Vicryl.  The rectus muscles were reapproximated   in the midline with placement of a few interrupted mattress sutures of 2-0   chromic.  The wound was carefully inspected and hemostasis was noted to be   excellent.  The anterior rectus fascia was identified and reapproximated with   simple continuous suture using #1 Vicryl.  The wound was copiously irrigated   and  drained and hemostasis was noted to be excellent.  The subcutaneous   tissues were reapproximated with a continuous simple suture of 3-0 Vicryl.    Finally, the skin was reapproximated with placement of many interrupted buried   sutures of 4-0 Monocryl placed in the dermis and at least one such suture was   placed for every 1 cm of length along the entire length of the skin incision.    The skin incision was thus reapproximated nicely in this way.  The procedure   was terminated.  Final lap and needle counts reported to be correct x2 at the   end of the procedure.  The patient tolerated the procedure well and sent to   postanesthesia recovery in stable condition.       ____________________________________     Gareth Bolden MD    MED / NTS    DD:  07/18/2020 11:58:28  DT:  07/18/2020 12:52:19    D#:  [id number]  Job#:  [id number]    cc: MOUNA ESCOBAR MD, Elizabeth Harmon MD

## 2020-07-18 NOTE — PROGRESS NOTES
1900: Assumed care of patient, Report Received from NELY Zamora RN. Prasad, Gestation 41.0.     Patient in bed, awake, RN at bedside, denies pain, pleasant affect. Epidural infusing. XAVIER, REN & Dr. Bolden at Bedside. POC discussed, all questions answered.     Patient would like Javy PARK at bedside for delivery phase. Patient would like skin to skin, FOB to cut cord after pulsating, breast feeding with DBM as necessary.     EFM used, discussed and in place.     Patient and family deny questions regarding care since arriving at [facility]. Dry erase board updated with RN contact information, discussed. Patient and family encouraged to call RN with all questions, concerns and needs.       Dr. Bolden at BS. AROM; clear; IUPC placed. SVE: 4/70/-2. Patient comfortable with epidural at this time. Novak placed; patient tolerated well.     2115: Dr. Bolden on floor; updated on POC & Pt. Status.     2145: FHR Strip reviewed with Dr. Bolden. Orders rec'd to start Pitocin. See MAR.     0000: Dr. Bolden at BS; SVE: 5-6/80/-2.     0210: RN at BS for SVE: 8/90/0. Patient resting with peanut ball. FOB at BS for support. Room setup for delivery.     0300: Pt. Calls out feeling pressure. No change - 8/90/0. Patient feeling nauseous. Meds given - see MAR.      0400: RN at B at BS to assist Pt into hands and knees position in bed. Patient tolerated well. Resting in right wedge with peanut ball.     0430: SVE: 9.5/100/0. Patient assisted into throne position with peanut ball for support.     0500: Patient repositioned into left wedge with peanut ball. Bolus of IV fluids open. See MAR.     0505: Call to Dr. Bolden placed. Updated on patient status.     0540: Pt. C/+1. Pushing efforts started.     0700: Report given to MARIETTA Pena RN.

## 2020-07-19 PROCEDURE — A9270 NON-COVERED ITEM OR SERVICE: HCPCS | Performed by: SPECIALIST

## 2020-07-19 PROCEDURE — 700102 HCHG RX REV CODE 250 W/ 637 OVERRIDE(OP): Performed by: SPECIALIST

## 2020-07-19 PROCEDURE — 770002 HCHG ROOM/CARE - OB PRIVATE (112)

## 2020-07-19 PROCEDURE — 700111 HCHG RX REV CODE 636 W/ 250 OVERRIDE (IP): Performed by: ANESTHESIOLOGY

## 2020-07-19 PROCEDURE — A9270 NON-COVERED ITEM OR SERVICE: HCPCS | Performed by: ANESTHESIOLOGY

## 2020-07-19 PROCEDURE — 700112 HCHG RX REV CODE 229: Performed by: SPECIALIST

## 2020-07-19 PROCEDURE — 700102 HCHG RX REV CODE 250 W/ 637 OVERRIDE(OP): Performed by: OBSTETRICS & GYNECOLOGY

## 2020-07-19 PROCEDURE — A9270 NON-COVERED ITEM OR SERVICE: HCPCS | Performed by: OBSTETRICS & GYNECOLOGY

## 2020-07-19 PROCEDURE — 700102 HCHG RX REV CODE 250 W/ 637 OVERRIDE(OP): Performed by: ANESTHESIOLOGY

## 2020-07-19 RX ORDER — ACETAMINOPHEN 325 MG/1
325 TABLET ORAL EVERY 4 HOURS PRN
Status: DISCONTINUED | OUTPATIENT
Start: 2020-07-19 | End: 2020-07-21 | Stop reason: HOSPADM

## 2020-07-19 RX ORDER — ONDANSETRON 4 MG/1
4 TABLET, ORALLY DISINTEGRATING ORAL EVERY 6 HOURS PRN
Status: DISCONTINUED | OUTPATIENT
Start: 2020-07-19 | End: 2020-07-21 | Stop reason: HOSPADM

## 2020-07-19 RX ORDER — MORPHINE SULFATE 4 MG/ML
4 INJECTION, SOLUTION INTRAMUSCULAR; INTRAVENOUS
Status: DISCONTINUED | OUTPATIENT
Start: 2020-07-19 | End: 2020-07-21 | Stop reason: HOSPADM

## 2020-07-19 RX ORDER — ONDANSETRON 2 MG/ML
4 INJECTION INTRAMUSCULAR; INTRAVENOUS EVERY 6 HOURS PRN
Status: DISCONTINUED | OUTPATIENT
Start: 2020-07-19 | End: 2020-07-21 | Stop reason: HOSPADM

## 2020-07-19 RX ORDER — OXYCODONE AND ACETAMINOPHEN 10; 325 MG/1; MG/1
1 TABLET ORAL EVERY 4 HOURS PRN
Status: DISCONTINUED | OUTPATIENT
Start: 2020-07-19 | End: 2020-07-21 | Stop reason: HOSPADM

## 2020-07-19 RX ORDER — FERROUS SULFATE 325(65) MG
325 TABLET ORAL
Status: DISCONTINUED | OUTPATIENT
Start: 2020-07-19 | End: 2020-07-21 | Stop reason: HOSPADM

## 2020-07-19 RX ORDER — OXYCODONE HYDROCHLORIDE AND ACETAMINOPHEN 5; 325 MG/1; MG/1
1 TABLET ORAL EVERY 4 HOURS PRN
Status: DISCONTINUED | OUTPATIENT
Start: 2020-07-19 | End: 2020-07-21 | Stop reason: HOSPADM

## 2020-07-19 RX ADMIN — OXYCODONE HYDROCHLORIDE AND ACETAMINOPHEN 1 TABLET: 5; 325 TABLET ORAL at 19:54

## 2020-07-19 RX ADMIN — DOCUSATE SODIUM 100 MG: 100 CAPSULE, LIQUID FILLED ORAL at 15:55

## 2020-07-19 RX ADMIN — OXYCODONE HYDROCHLORIDE AND ACETAMINOPHEN 1 TABLET: 5; 325 TABLET ORAL at 15:55

## 2020-07-19 RX ADMIN — ACETAMINOPHEN 1000 MG: 500 TABLET ORAL at 09:43

## 2020-07-19 RX ADMIN — KETOROLAC TROMETHAMINE 30 MG: 30 INJECTION, SOLUTION INTRAMUSCULAR at 03:54

## 2020-07-19 RX ADMIN — ACETAMINOPHEN 1000 MG: 500 TABLET ORAL at 03:54

## 2020-07-19 RX ADMIN — FERROUS SULFATE TAB 325 MG (65 MG ELEMENTAL FE) 325 MG: 325 (65 FE) TAB at 09:43

## 2020-07-19 NOTE — PROGRESS NOTES
Bands checked with MOB FOB infant with LD RN. Unit and clan of care explained and verbalized understanding

## 2020-07-19 NOTE — LACTATION NOTE
@1320 met with POSARAHY, POB state baby has been breastfeeding frequently, MOB reports occasional discomfort related to a shallow latch, reminded of the importance of a deep latch and the damage caused by a shallow latch, POB state baby has voided and stooled, MOB states baby  an hour ago and declines offer for assistance at this time, encouraged frequent breastfeeding attempts, encouraged to offer both breasts at each feeding    Plan:  Ad dewey breastfeeding attempts at least Q 3-4 hours, more often if feeding cues noted  [id number]  Call for assistance as needed    Written and verbal information provided on outpatient breastfeeding assistance available at [facility] after discharge and encouraged to call to schedule consult as needed, informed that [facility] [place] is on hold for the time being but if interested in attending to check the hospital web site for information on when it will resume, zoom meeting information provided as well    Encouraged to call for assistance as needed

## 2020-07-19 NOTE — PROGRESS NOTES
Name:   Komal Carranza   Date/Time:  2020 8:36 AM  Gestational Age:  41w2d  Admit Date:   2020  Admitting Dx:   Pregnancy  Indication for care in labor or delivery    POD# 1 S/P [facility]    S:  Abdominal pain no   Ambulating   Yes-to bathroom  Tolerating PO  yes  Flatus    unknown  Bleeding   Yes-appropriate  Voiding   yes   Dizziness   Yes-improved  Breast feeding  yes  Breast tenderness  no    O:  Pulse: 61  Blood Pressure: 130/64     Temp  Av.6 °C (97.8 °F)  Min: 36.2 °C (97.2 °F)  Max: 37.1 °C (98.8 °F)  Heart: regular rate and rhythm without gallops or murmurs  Lungs: clear bases  Abdomen: flat and tender (approriate for post-op) / bowelsounds present /dressing clean and dry.  Extremities: edema 1+  Catheter: DC'd    Intake/Output Summary (Last 24 hours) at 2020 0836  Last data filed at 2020 0819  Gross per 24 hour   Intake 1517 ml   Output 1950 ml   Net -433 ml     Recent Labs     20  1000 20  1822   WBC 7.7 13.7*   RBC 3.93* 3.10*   HEMOGLOBIN 11.6* 9.3*   HEMATOCRIT 34.8* 28.0*   MCV 88.5 90.3   MCH 29.5 30.0   RDW 42.7 43.8   PLATELETCT 164 134*   MPV 9.5 9.3   NEUTSPOLYS 66.00  --    LYMPHOCYTES 22.40  --    MONOCYTES 9.10  --    EOSINOPHILS 0.60  --    BASOPHILS 0.50  --          A:  POD# 1 S/P LTCS for failure in the 2nd stage, failed VAVD  Stable/progressing well   Iron for anemia  Repeat CBC in AM to follow plt    P:  Routine C/S Postpartum care, continue pain management, encourage ambulation, anticipate DC POD#2-3     Sheng Marx M.D.

## 2020-07-19 NOTE — CARE PLAN
Problem: Communication  Goal: The ability to communicate needs accurately and effectively will improve  Note: Plan of care discussed verbalized understanding       Problem: Safety  Goal: Will remain free from injury  Note: Call light with in reach verbalized understanding of need for calling for assistance   Will continue to assess

## 2020-07-19 NOTE — PROGRESS NOTES
Assessment complete. Fundus firm, lochia light. Pain 4/10, medicated per MAR. Novak in place, dressing CDI. Discussed POC for the night. All questions answered at this time. Call light within reach. Encouraged patient to call with any further questions or concerns.

## 2020-07-19 NOTE — CARE PLAN
Problem: Potential for postpartum infection related to surgical incision, compromised uterine condition, urinary tract or respiratory compromise  Goal: Patient will be afebrile and free from signs and symptoms of infection  Outcome: PROGRESSING AS EXPECTED  Note: No signs or symptoms of infection, VSS, will continue to monitor     Problem: Alteration in comfort related to surgical incision and/or after birth pains  Goal: Patient verbalizes acceptable pain level  Outcome: PROGRESSING AS EXPECTED  Note: Patient states pain is tolerable with the use of scheduled pain medications

## 2020-07-19 NOTE — LACTATION NOTE
This note was copied from a baby's chart.  Assisted with breastfeeding, infant eagerly attached and sustained feeding on left breast with nutritive suck and occasional swallows. Reviewed waking techniques and frequency/duration of feeds with mother as well and positioning and latch techniques. Provided [employer] breastfeeding education card. Plan is to breastfeed on demand at least 8 or more times per 24 hours. Denies questions/concerns. LC to follow up tomorrow.

## 2020-07-20 LAB
BASOPHILS # BLD AUTO: 0.3 % (ref 0–1.8)
BASOPHILS # BLD: 0.03 K/UL (ref 0–0.12)
EOSINOPHIL # BLD AUTO: 0.12 K/UL (ref 0–0.51)
EOSINOPHIL NFR BLD: 1.1 % (ref 0–6.9)
ERYTHROCYTE [DISTWIDTH] IN BLOOD BY AUTOMATED COUNT: 45.1 FL (ref 35.9–50)
HCT VFR BLD AUTO: 27.2 % (ref 37–47)
HGB BLD-MCNC: 8.9 G/DL (ref 12–16)
IMM GRANULOCYTES # BLD AUTO: 0.11 K/UL (ref 0–0.11)
IMM GRANULOCYTES NFR BLD AUTO: 1 % (ref 0–0.9)
LYMPHOCYTES # BLD AUTO: 2.04 K/UL (ref 1–4.8)
LYMPHOCYTES NFR BLD: 18.6 % (ref 22–41)
MCH RBC QN AUTO: 29.8 PG (ref 27–33)
MCHC RBC AUTO-ENTMCNC: 32.7 G/DL (ref 33.6–35)
MCV RBC AUTO: 91 FL (ref 81.4–97.8)
MONOCYTES # BLD AUTO: 0.8 K/UL (ref 0–0.85)
MONOCYTES NFR BLD AUTO: 7.3 % (ref 0–13.4)
NEUTROPHILS # BLD AUTO: 7.89 K/UL (ref 2–7.15)
NEUTROPHILS NFR BLD: 71.7 % (ref 44–72)
NRBC # BLD AUTO: 0 K/UL
NRBC BLD-RTO: 0 /100 WBC
PLATELET # BLD AUTO: 157 K/UL (ref 164–446)
PMV BLD AUTO: 9.7 FL (ref 9–12.9)
RBC # BLD AUTO: 2.99 M/UL (ref 4.2–5.4)
WBC # BLD AUTO: 11 K/UL (ref 4.8–10.8)

## 2020-07-20 PROCEDURE — 700112 HCHG RX REV CODE 229: Performed by: SPECIALIST

## 2020-07-20 PROCEDURE — 85025 COMPLETE CBC W/AUTO DIFF WBC: CPT

## 2020-07-20 PROCEDURE — A9270 NON-COVERED ITEM OR SERVICE: HCPCS | Performed by: OBSTETRICS & GYNECOLOGY

## 2020-07-20 PROCEDURE — 700102 HCHG RX REV CODE 250 W/ 637 OVERRIDE(OP): Performed by: SPECIALIST

## 2020-07-20 PROCEDURE — A9270 NON-COVERED ITEM OR SERVICE: HCPCS | Performed by: SPECIALIST

## 2020-07-20 PROCEDURE — 36415 COLL VENOUS BLD VENIPUNCTURE: CPT

## 2020-07-20 PROCEDURE — 770002 HCHG ROOM/CARE - OB PRIVATE (112)

## 2020-07-20 PROCEDURE — 700102 HCHG RX REV CODE 250 W/ 637 OVERRIDE(OP): Performed by: OBSTETRICS & GYNECOLOGY

## 2020-07-20 RX ORDER — IBUPROFEN 600 MG/1
600 TABLET ORAL EVERY 6 HOURS PRN
Status: DISCONTINUED | OUTPATIENT
Start: 2020-07-20 | End: 2020-07-21 | Stop reason: HOSPADM

## 2020-07-20 RX ADMIN — OXYCODONE HYDROCHLORIDE AND ACETAMINOPHEN 1 TABLET: 5; 325 TABLET ORAL at 16:04

## 2020-07-20 RX ADMIN — DOCUSATE SODIUM 100 MG: 100 CAPSULE, LIQUID FILLED ORAL at 08:02

## 2020-07-20 RX ADMIN — OXYCODONE HYDROCHLORIDE AND ACETAMINOPHEN 1 TABLET: 5; 325 TABLET ORAL at 08:02

## 2020-07-20 RX ADMIN — FERROUS SULFATE TAB 325 MG (65 MG ELEMENTAL FE) 325 MG: 325 (65 FE) TAB at 08:02

## 2020-07-20 RX ADMIN — OXYCODONE HYDROCHLORIDE AND ACETAMINOPHEN 1 TABLET: 5; 325 TABLET ORAL at 04:17

## 2020-07-20 RX ADMIN — OXYCODONE HYDROCHLORIDE AND ACETAMINOPHEN 1 TABLET: 5; 325 TABLET ORAL at 12:24

## 2020-07-20 RX ADMIN — OXYCODONE HYDROCHLORIDE AND ACETAMINOPHEN 1 TABLET: 5; 325 TABLET ORAL at 00:12

## 2020-07-20 RX ADMIN — OXYCODONE HYDROCHLORIDE AND ACETAMINOPHEN 1 TABLET: 5; 325 TABLET ORAL at 20:17

## 2020-07-20 NOTE — PROGRESS NOTES
Assessment complete. Fundus firm, lochia light. Pain 4/10, medicated per MAR. Dressing has scant old drainage. Discussed POC for the night. All questions answered at this time. Call light within reach. Encouraged patient to call with any further questions or concerns.

## 2020-07-20 NOTE — PROGRESS NOTES
Post op day # 2  The patient says that she does have some soreness.   She says that she feels fine otherwise.   She says that she has been ambulating and urinating and tolerating a regular diet and passing flatus.   The patient's vital signs are stable and she has been afebrile.   She appears well developed and well nourished and relaxed and alert and comfortable and in no apparent distress.   Labs: the patient's hemoglobin this morning was 8.9 mg per deciliter.   Will plan on discharge home tomorrow.   Gareth Bolden M.D.

## 2020-07-20 NOTE — PROGRESS NOTES
A bedside report received from Esther MCCLURE @ the change of shift.  Assumed care. Discussed plan of care especially on managing pain. Assessment done. Medicated for pain. Encouraged to call if with needs. Will check at intervals.

## 2020-07-20 NOTE — CARE PLAN
Problem: Altered physiologic condition related to postoperative  delivery  Goal: Patient physiologically stable as evidenced by normal lochia, palpable uterine involution and vital signs within normal limits  Outcome: PROGRESSING AS EXPECTED  Note: VSS, assessment WNL, will continue to monitor     Problem: Potential knowledge deficit related to lack of understanding of self and  care  Goal: Patient will demonstrate ability to care for self and infant  Outcome: PROGRESSING AS EXPECTED  Note: Patient able to care for self and infant appropriately

## 2020-07-20 NOTE — DISCHARGE PLANNING
Discharge Planning Assessment Post Partum    Reason for Referral: History of anxiety  Address: [address] [place] [address]  Phone: [phone]  Type of Living Situation: living with FOB  Mom Diagnosis: Pregnancy,   Baby Diagnosis: -41 weeks  Primary Language: English    Name of Baby: Benigno Carranza (: 20)  Father of the Baby: Javy Carranza  Involved in baby’s care? Yes  Contact Information: [phone]    Prenatal Care: Yes  Mom's PCP: Unknown  PCP for new baby: Dr. Olivas    Support System: FOB and both parent's families   Coping/Bonding between mother & baby: Yes  Source of Feeding: breast feeding  Supplies for Infant: prepared for infant    Mom's Insurance: Miscellaneous  Baby Covered on Insurance:Yes, [employer]  Mother Employed/School: Not currently  Other children in the home/names & ages: no, 1st baby    Financial Hardship/Income: denies   Mom's Mental status: alert and oriented  Services used prior to admit: none    CPS History: No  Psychiatric History: history of anxiety.  Provided MOB with counseling specializing in  mental health  Domestic Violence History: No  Drug/ETOH History: No    Resources Provided: post partum support and counseling resources and a children and family resource list  Referrals Made: diaper bank referral provided     Clearance for Discharge: Infant is cleared to discharge home with parents

## 2020-07-20 NOTE — LACTATION NOTE
This note was copied from a baby's chart.  Primip Mom is calm and has been feeding baby ad dewey. He is post circumcision yesterday-is rooting and vigorous at breast. Assisted Mom to get deeper latch. She states that she has several [id number] for support. She will continue to feed minimum of 8-10x daily, will wake to feed if greater than 3 hours during the day or 4 hours during the night since previous feeding. Provided written contact info for  Medicine Center and  zoom. Encouraged to call for any additional LC assist if needed.

## 2020-07-21 VITALS
HEART RATE: 80 BPM | RESPIRATION RATE: 18 BRPM | SYSTOLIC BLOOD PRESSURE: 124 MMHG | WEIGHT: 218 LBS | OXYGEN SATURATION: 93 % | HEIGHT: 65 IN | DIASTOLIC BLOOD PRESSURE: 61 MMHG | TEMPERATURE: 97.5 F | BODY MASS INDEX: 36.32 KG/M2

## 2020-07-21 PROBLEM — O48.0 POST-DATES PREGNANCY: Status: ACTIVE | Noted: 2020-07-21

## 2020-07-21 PROBLEM — O61.9 FAILED INDUCTION OF LABOR: Status: ACTIVE | Noted: 2020-07-21

## 2020-07-21 PROCEDURE — 700102 HCHG RX REV CODE 250 W/ 637 OVERRIDE(OP): Performed by: OBSTETRICS & GYNECOLOGY

## 2020-07-21 PROCEDURE — A9270 NON-COVERED ITEM OR SERVICE: HCPCS | Performed by: SPECIALIST

## 2020-07-21 PROCEDURE — 700112 HCHG RX REV CODE 229: Performed by: SPECIALIST

## 2020-07-21 PROCEDURE — A9270 NON-COVERED ITEM OR SERVICE: HCPCS | Performed by: OBSTETRICS & GYNECOLOGY

## 2020-07-21 PROCEDURE — 700102 HCHG RX REV CODE 250 W/ 637 OVERRIDE(OP): Performed by: SPECIALIST

## 2020-07-21 RX ORDER — OXYCODONE HYDROCHLORIDE AND ACETAMINOPHEN 5; 325 MG/1; MG/1
1 TABLET ORAL EVERY 6 HOURS PRN
Qty: 28 TAB | Refills: 0 | Status: SHIPPED | OUTPATIENT
Start: 2020-07-21 | End: 2020-07-28

## 2020-07-21 RX ORDER — FERROUS SULFATE 325(65) MG
325 TABLET ORAL DAILY
Qty: 30 TAB | Refills: 0 | Status: SHIPPED | OUTPATIENT
Start: 2020-07-21

## 2020-07-21 RX ORDER — IBUPROFEN 800 MG/1
800 TABLET ORAL EVERY 8 HOURS PRN
Qty: 30 TAB | Refills: 0 | Status: SHIPPED | OUTPATIENT
Start: 2020-07-21

## 2020-07-21 RX ORDER — DOCUSATE SODIUM 100 MG/1
100 CAPSULE, LIQUID FILLED ORAL 2 TIMES DAILY
Qty: 60 CAP | Refills: 0 | Status: SHIPPED | OUTPATIENT
Start: 2020-07-21

## 2020-07-21 RX ADMIN — IBUPROFEN 600 MG: 600 TABLET ORAL at 01:41

## 2020-07-21 RX ADMIN — OXYCODONE HYDROCHLORIDE AND ACETAMINOPHEN 1 TABLET: 5; 325 TABLET ORAL at 08:05

## 2020-07-21 RX ADMIN — DOCUSATE SODIUM 100 MG: 100 CAPSULE, LIQUID FILLED ORAL at 08:05

## 2020-07-21 RX ADMIN — IBUPROFEN 600 MG: 600 TABLET ORAL at 08:05

## 2020-07-21 RX ADMIN — OXYCODONE HYDROCHLORIDE AND ACETAMINOPHEN 1 TABLET: 5; 325 TABLET ORAL at 01:41

## 2020-07-21 RX ADMIN — FERROUS SULFATE TAB 325 MG (65 MG ELEMENTAL FE) 325 MG: 325 (65 FE) TAB at 08:05

## 2020-07-21 NOTE — PROGRESS NOTES
"1915: call in to pt room because \"baby was not breathing well\". Baby was pink resting on Pt chest with pacifier in his mouth. Assessment done. Baby did sound a little congested and gave a couple snorts while crying. Educated pt and FOB on signs of respiratory distress and encouraged them to call with any questions or concerns. Pt started crying and stated that she is fine but just overwhelmed. Reassured pt that this is all a new learning experience and we are here to help and answer any questions they have. Pt wanted to breastfeed baby. Assisted pt with breastfeeding and then pt was able to latch baby on her own. Call light is within reach and emergency cord explained to pt and FOB.    2015: pt requesting her pain medication, percocet 5 mg, every 4 hours. Pt states her pain at a 2. Pt given her pain medication. Did offer pt to consider taking motrin for lower pain and see if that will help. Pt agreed to this and said for next time.    2045: call placed to Dr. Bolden and received telephone order for Motrin 600 mg PO Q6H PRN.   "

## 2020-07-21 NOTE — PROGRESS NOTES
Post op day # 3  The patient says that other than for soreness she feels fine and has no problems or complaints.   She says that she would like to go home today.   The patient's vital signs are stable and she is afebrile.   She appears well developed and well nourished and relaxed and alert and comfortable and in no apparent distress.   Will discharge home today.  Rx's for percocet and ibuprofen and iron sulfate and stool softener.   Follow up in office in about two weeks for a post op visit.   Gareth Bolden M.D.

## 2020-07-21 NOTE — DISCHARGE INSTRUCTIONS
POSTPARTUM DISCHARGE INSTRUCTIONS FOR MOM    YOB: 1995   Age: 24 y.o.               Admit Date: 2020     Discharge Date: 2020  Attending Doctor:  Gareth Bolden M.D.                  Allergies:  Food    Discharged to home by car. Discharged via wheelchair, hospital escort: Yes.  Special equipment needed: Not Applicable  Belongings with: Personal  Be sure to schedule a follow-up appointment with your primary care doctor or any specialists as instructed.     Discharge Plan:   Diet Plan: Discussed  Activity Level: Discussed  Confirmed Follow up Appointment: Patient to Call and Schedule Appointment  Medication Reconciliation Updated: Yes    REASONS TO CALL YOUR OBSTETRICIAN:  1.   Persistent fever or shaking chills (Temperature higher than 100.4)  2.   Heavy bleeding (soaking more than 1 pad per hour); Passing clots  3.   Foul odor from vagina  4.   Mastitis (Breast infection; breast pain, chills, fever, redness)  5.   Urinary pain, burning or frequency  6.   Abdominal incision infection  7.   Severe depression longer than 24 hours    HAND WASHING  · Prior to handling the baby.  · Before breastfeeding or bottle feeding baby.  · After using the bathroom or changing the baby's diaper.    WOUND CARE  Ask your physician for additional care instructions.  In general:    ·  Incision:      · Keep clean and dry.    · Do NOT lift anything heavier than your baby for up to 6 weeks.    · There should not be any opening or pus.      VAGINAL CARE  · Nothing inside vagina for 6 weeks: no sexual intercourse, tampons or douching.  · Bleeding may continue for 2-4 weeks.  Amount may vary.    · Call your physician for heavy bleeding which means soaking more than 1 pad per hour    BIRTH CONTROL  · It is possible to become pregnant at any time after delivery and while breastfeeding.  · Plan to discuss a method of birth control with your physician at your follow up visit. visit.    DIET AND  "ELIMINATION  · Eating more fiber (bran cereal, fruits, and vegetables) and drinking plenty of fluids will help to avoid constipation.  · Urinary frequency after childbirth is normal.    POSTPARTUM BLUES  During the first few days after birth, you may experience a sense of the \"blues\" which may include impatience, irritability or even crying.  These feeling come and go quickly.  However, as many as 1 in 10 women experience emotional symptoms known as postpartum depression.    Postpartum depression:  May start as early as the second or third day after delivery or take several weeks or months to develop.  Symptoms of \"blues\" are present, but are more intense:  Crying spells; loss of appetite; feelings of hopelessness or loss of control; fear of touching the baby; over concern or no concern at all about the baby; little or no concern about your own appearance/caring for yourself; and/or inability to sleep or excessive sleeping.  Contact your physician if you are experiencing any of these symptoms.    Crisis Hotline:  · [place] Crisis Hotline:  9-813-ZTTYFCF  Or 1-465.878.3746  · [place] Crisis Hotline:  1-836.678.3420  Or 859-786-2902    PREVENTING SHAKEN BABY:  If you are angry or stressed, PUT THE BABY IN THE CRIB, step away, take some deep breaths, and wait until you are calm to care for the baby.  DO NOT SHAKE THE BABY.  You are not alone, call a supporter for help.    · Crisis Call Center 24/7 crisis line 195-675-4089 or 1-109.847.5139  · You can also text them, text \"ANSWER\" to 245669    QUIT SMOKING/TOBACCO USE:  I understand the use of any tobacco products increases my chance of suffering from future heart disease and could cause other illnesses which may shorten my life. Quitting the use of tobacco products is the single most important thing I can do to improve my health. For further information on smoking / tobacco cessation call a Toll Free Quit Line at 1-512.347.7267 (*National Cancer [place]) or " 1-192.562.3866 (American Lung Association) or you can access the web based program at www.lungusa.org.    · [place] Tobacco Users Help Line:  (502) 652-5862       Toll Free: 1-683.219.7093  · Quit Tobacco Program [facility] (807)227-1242    DEPRESSION / SUICIDE RISK:  As you are discharged from this [facility], it is important to learn how to keep safe from harming yourself.    Recognize the warning signs:  · Abrupt changes in personality, positive or negative- including increase in energy   · Giving away possessions  · Change in eating patterns- significant weight changes-  positive or negative  · Change in sleeping patterns- unable to sleep or sleeping all the time   · Unwillingness or inability to communicate  · Depression  · Unusual sadness, discouragement and loneliness  · Talk of wanting to die  · Neglect of personal appearance   · Rebelliousness- reckless behavior  · Withdrawal from people/activities they love  · Confusion- inability to concentrate     If you or a loved one observes any of these behaviors or has concerns about self-harm, here's what you can do:  · Talk about it- your feelings and reasons for harming yourself  · Remove any means that you might use to hurt yourself (examples: pills, rope, extension cords, firearm)  · Get professional help from the community (Mental Health, Substance Abuse, psychological counseling)  · Do not be alone:Call your Safe Contact- someone whom you trust who will be there for you.  · Call your local CRISIS HOTLINE 985-6501 or 020-858-7096  · Call your local Children's Mobile Crisis Response Team [place] (580) 427-3077 or www.[place]  · Call the toll free National Suicide Prevention Hotlines   · National Suicide Prevention Lifeline 612-109-XRBH (0029)  · National Hope Line Network 800-SUICIDE (351-8590)    DISCHARGE SURVEY:  Thank you for choosing [facility].  We hope we provided you with very good care.  You may be  receiving a survey in the mail.  Please fill it out.  Your opinion is valuable to us.    ADDITIONAL EDUCATIONAL MATERIALS GIVEN TO PATIENT:        My signature on this form indicates that:  1.  I have reviewed and understand the above information  2.  My questions regarding this information have been answered to my satisfaction.  3.  I have formulated a plan with my discharge nurse to obtain my prescribed medication for home.

## 2020-07-21 NOTE — PROGRESS NOTES
An update report received from Minda MCCLURE. Assumed care. Discharge instruction for mom and baby discussed. Prescription given and explained. Emphasized the importance of  screening follow-up test. Questions and concerns have been answered. Baby's ID band matches with the patient and FOB.

## 2020-08-30 NOTE — DISCHARGE SUMMARY
DATE OF ADMISSION:  2020    DATE OF DISCHARGE:  2020    ADMISSION DIAGNOSES:  1.  Intrauterine pregnancy at 41 weeks' gestation.  2.  The patient is being admitted for induction of labor for postdates.    DISCHARGE DIAGNOSES:  1.  Intrauterine pregnancy at 41 weeks and 1 day gestation.  2.  Failed induction of labor (failed attempt to accomplish vaginal delivery   with the vacuum).  3.  Prolonged second stage.  4.  Live term male  with Apgar scores of 7 and 8 at one and five   minutes respectively and a  weight of 3670 g.  5.  Postpartum/postoperative anemia.    PROCEDURE:  Primary low transverse  section.    COMPLICATIONS:  None.    HOSPITAL COURSE:  The patient had her prenatal care with myself during the   course of her pregnancy and her due date was established as 07/10/2020.  She   was admitted to [facility] [place] [facility] in the   morning of 2020 at about 8 in the morning for induction of labor for   postdates.  She was 41 weeks' gestation on admission.  The estimated fetal   weight was 3800 g.  Vaginal culture for group B strep had come back negative   for group B strep. She was admitted to labor and delivery and her cervix was   found to be long and closed and high and she received a dose of Cytotec 25 mcg   per vagina on 2020 and within several hours, she began having uterine   contractions and her cervix changed.  I checked her cervix in the afternoon of   the day of admission and found her cervix to be about 2 cm dilated, 80%   effaced, and -2 station and the fetal heart tracing was found to be category   1.  She subsequently received a labor epidural which functioned well.  Then,   at about 7 p.m. in the evening of the day of admission, it was noted that she   had received a labor epidural and her labor epidural was functioning well and   I checked her cervix at that time and found her cervix to be about 4 cm   dilated and 70% effaced  and -2 station and then I did at that time, namely at   about 7 p.m., perform artificial rupture of membranes and clear amniotic fluid   was observed and then I placed an intrauterine pressure catheter.  She was   started on Pitocin at one point in time.  The Pitocin was continued.  At about   midnight, her cervix was about 5-6 cm dilated and 80% effaced and -2 station.    Then, at 10 minutes after 2 in the morning, on 2020, her cervix was   found to be 8 cm dilated, 90% effaced and 0 station.  At about 3 in the   morning, her cervix was still about 8 cm dilated, 90% effaced, and 0 station.    At 4:30 in the morning, her cervix was between 9 and 10 cm dilated and   completely effaced and the station was 0 station.  By 5:50 in the morning, her   cervix was found to be completely dilated and the station was about +1   station.  At about 5:50 in the morning, she began pushing with her   contractions.  She pushed for 3 hours.  The station was right occiput   anterior.  After more than 3 hours of pushing/second stage, the decision was   made to expedite delivery with the use of a vacuum and I discussed this with   the patient and she agreed.  I explained to her that if I was not able to   accomplish vaginal delivery after 3 attempts with the vacuum, then we would   need to proceed with  section and she agreed.  So, the vacuum was   placed at about +2 to +3 station, right occiput anterior, secondary to   prolonged second stage.  Three attempts with the vacuum (3 contractions) were   made, but we were not successful.  The vacuum was then removed.  I explained   to the patient that I recommended after 3 failed attempts with the vacuum that   we proceed with  section and discussed this with her and she agreed.    So she was taken to the operating room where a primary low transverse    section was performed at 41 weeks and 1 day gestation, and she was   delivered of a live term male  with  Apgar scores of 7 and 8 at one and   five minutes respectively and a  weight of 3670 g.  The    section was performed in the morning of 2020.    The patient's postoperative course was uncomplicated except for the fact that   she had postoperative anemia.  Her antepartum hemoglobin was 11.6 g/dL and her   postpartum hemoglobin later in the day of delivery, namely in the evening of   2020 was 9.3 g/dL and then on postpartum day #2/postoperative day #2,   her hemoglobin was 8.9 g/dL.  On 2020, postoperative day #3, the patient   said that she had some soreness and that she felt fine otherwise and had no   other problems or complaints and she was ambulating and urinating and   tolerating a regular diet and said she wanted to go home.  Her vital signs   were stable and she was afebrile and she appeared well developed and well   nourished and relaxed and alert and comfortable and in no apparent distress.    She was discharged home on that day, namely 2020, in stable condition   with prescriptions for Percocet and ibuprofen and iron sulfate and stool   softener.  I asked her to follow up with me in the office in about 2 weeks for   postoperative visit and to call or contact me at any time should she ever   have any problems or questions or complaints and she said that she would do   so.       ____________________________________     Gareth Bolden MD    MED / [facility]    DD:  2020 21:46:54  DT:  2020 22:06:36    D#:  [id number]  Job#:  [id number]

## 2021-03-24 NOTE — LACTATION NOTE
This note was copied from a baby's chart.  @1430 called to room for latch assessment, MOB was breastfeeding baby when LC arrived, deep latch with widely-flanged lips noted, nutritive suck noted however after a few minutes baby fell asleep, educated POB on signs of a nutritive vs non-nutritive suck, educated on signs of a deep latch, POB request pacifier for use because POB state they feel baby often appears to suck for comfort, education provided on pacifier use and importance of not missing signs baby needs to be fed longer/more often, also educated on potential impact on infant breastfeeding, pacifier then provided per POB request, educated on expected feeding frequency and duration, educated on expected urine and stool output    Encouraged to call for assistance as needed   Lot #: [id number] Lot #: [id number]

## 2023-03-13 NOTE — DISCHARGE PLANNING
Birth certificate has been completed.   Brow Lift Text: A midfrontal incision was made medially to the defect to allow access to the tissues just superior to the left eyebrow. Following careful dissection inferiorly in a supraperiosteal plane to the level of the left eyebrow, several 3-0 monocryl sutures were used to resuspend the eyebrow orbicularis oculi muscular unit to the superior frontal bone periosteum. This resulted in an appropriate reapproximation of static eyebrow symmetry and correction of the left brow ptosis.

## 2024-05-07 ENCOUNTER — HOSPITAL ENCOUNTER (OUTPATIENT)
Facility: MEDICAL CENTER | Age: 29
End: 2024-05-07
Attending: NURSE PRACTITIONER
Payer: COMMERCIAL

## 2024-05-07 ENCOUNTER — OFFICE VISIT (OUTPATIENT)
Dept: URGENT CARE | Facility: PHYSICIAN GROUP | Age: 29
End: 2024-05-07
Payer: COMMERCIAL

## 2024-05-07 VITALS
DIASTOLIC BLOOD PRESSURE: 78 MMHG | HEART RATE: 95 BPM | TEMPERATURE: 97.1 F | HEIGHT: 65 IN | WEIGHT: 222.22 LBS | OXYGEN SATURATION: 96 % | BODY MASS INDEX: 37.02 KG/M2 | RESPIRATION RATE: 16 BRPM | SYSTOLIC BLOOD PRESSURE: 112 MMHG

## 2024-05-07 DIAGNOSIS — R39.9 LOWER URINARY TRACT SYMPTOMS (LUTS): ICD-10-CM

## 2024-05-07 DIAGNOSIS — N30.01 ACUTE CYSTITIS WITH HEMATURIA: ICD-10-CM

## 2024-05-07 LAB
APPEARANCE UR: ABNORMAL
BILIRUB UR STRIP-MCNC: ABNORMAL MG/DL
COLOR UR AUTO: YELLOW
GLUCOSE UR STRIP.AUTO-MCNC: ABNORMAL MG/DL
KETONES UR STRIP.AUTO-MCNC: ABNORMAL MG/DL
LEUKOCYTE ESTERASE UR QL STRIP.AUTO: ABNORMAL
NITRITE UR QL STRIP.AUTO: POSITIVE
PH UR STRIP.AUTO: 7 [PH] (ref 5–8)
POCT INT CON NEG: NEGATIVE
POCT INT CON POS: POSITIVE
POCT URINE PREGNANCY TEST: NEGATIVE
PROT UR QL STRIP: 100 MG/DL
RBC UR QL AUTO: ABNORMAL
SP GR UR STRIP.AUTO: 1.02
UROBILINOGEN UR STRIP-MCNC: 0.2 MG/DL

## 2024-05-07 PROCEDURE — 3078F DIAST BP <80 MM HG: CPT | Performed by: NURSE PRACTITIONER

## 2024-05-07 PROCEDURE — 81025 URINE PREGNANCY TEST: CPT | Performed by: NURSE PRACTITIONER

## 2024-05-07 PROCEDURE — 81002 URINALYSIS NONAUTO W/O SCOPE: CPT | Performed by: NURSE PRACTITIONER

## 2024-05-07 PROCEDURE — 99202 OFFICE O/P NEW SF 15 MIN: CPT | Performed by: NURSE PRACTITIONER

## 2024-05-07 PROCEDURE — 3074F SYST BP LT 130 MM HG: CPT | Performed by: NURSE PRACTITIONER

## 2024-05-07 RX ORDER — PHENAZOPYRIDINE HYDROCHLORIDE 200 MG/1
200 TABLET, FILM COATED ORAL 3 TIMES DAILY PRN
Qty: 6 TABLET | Refills: 0 | Status: SHIPPED | OUTPATIENT
Start: 2024-05-07

## 2024-05-07 RX ORDER — CEFDINIR 300 MG/1
300 CAPSULE ORAL 2 TIMES DAILY
Qty: 10 CAPSULE | Refills: 0 | Status: SHIPPED | OUTPATIENT
Start: 2024-05-07 | End: 2024-05-12

## 2024-05-07 RX ORDER — FOLIC ACID 1 MG/1
1 TABLET ORAL DAILY
COMMUNITY

## 2024-05-07 ASSESSMENT — ENCOUNTER SYMPTOMS
VOMITING: 0
DIZZINESS: 0
FEVER: 0
HEADACHES: 0
CHILLS: 0
BACK PAIN: 1
ABDOMINAL PAIN: 1
NAUSEA: 0
FLANK PAIN: 1
DIARRHEA: 0

## 2024-05-07 NOTE — PROGRESS NOTES
Subjective     Komal Carranza is a 28 y.o. female who presents with UTI (X4-5 days, irritation, frequency)            HPI  New problem.  Patient is a 28-year-old female who presents with burning with urination, frequency, and urgency for the past 4 days.  She denies fever but states she did have chills.  She reports back pain and lower abdominal pain as well as blood in her urine.  She has been taking over-the-counter cranberry for her symptoms with mild relief.  Food  Current Outpatient Medications on File Prior to Visit   Medication Sig Dispense Refill    folic acid (FOLVITE) 1 MG Tab Take 1 mg by mouth every day.      ibuprofen (MOTRIN) 800 MG Tab Take 1 Tab by mouth every 8 hours as needed for Moderate Pain. 30 Tab 0    ferrous sulfate 325 (65 Fe) MG tablet Take 1 Tab by mouth every day. 30 Tab 0    docusate sodium (COLACE) 100 MG Cap Take 1 Cap by mouth 2 times a day. 60 Cap 0    Prenatal Vit-Fe Fumarate-FA (PRENATAL PO) Take  by mouth.       No current facility-administered medications on file prior to visit.     Social History     Socioeconomic History    Marital status:      Spouse name: Not on file    Number of children: Not on file    Years of education: Not on file    Highest education level: Not on file   Occupational History    Not on file   Tobacco Use    Smoking status: Never    Smokeless tobacco: Never   Vaping Use    Vaping Use: Never used   Substance and Sexual Activity    Alcohol use: Not Currently    Drug use: Never    Sexual activity: Not on file   Other Topics Concern    Not on file   Social History Narrative    Not on file     Social Determinants of Health     Financial Resource Strain: Not on file   Food Insecurity: Unknown (7/17/2020)    Hunger Vital Sign     Worried About Running Out of Food in the Last Year: Patient declined     Ran Out of Food in the Last Year: Patient declined   Transportation Needs: Unknown (7/17/2020)    PRAPARE - Transportation     Lack of Transportation  "(Medical): Patient declined     Lack of Transportation (Non-Medical): Patient declined   Physical Activity: Not on file   Stress: Not on file   Social Connections: Not on file   Intimate Partner Violence: Not on file   Housing Stability: Not on file     Breast Cancer-related family history is not on file.            Review of Systems   Constitutional:  Negative for chills and fever.   Gastrointestinal:  Positive for abdominal pain. Negative for diarrhea, nausea and vomiting.   Genitourinary:  Positive for dysuria, flank pain, frequency, hematuria and urgency.   Musculoskeletal:  Positive for back pain.   Neurological:  Negative for dizziness and headaches.              Objective     /78 (BP Location: Left arm, Patient Position: Sitting, BP Cuff Size: Large adult)   Pulse 95   Temp 36.2 °C (97.1 °F)   Resp 16   Ht 1.651 m (5' 5\")   Wt 101 kg (222 lb 3.6 oz)   SpO2 96%   BMI 36.98 kg/m²      Physical Exam  Vitals reviewed.   Constitutional:       General: She is not in acute distress.     Appearance: Normal appearance. She is well-developed.   Cardiovascular:      Rate and Rhythm: Normal rate and regular rhythm.      Heart sounds: Normal heart sounds. No murmur heard.  Pulmonary:      Effort: Pulmonary effort is normal. No respiratory distress.      Breath sounds: Normal breath sounds.   Abdominal:      General: Bowel sounds are normal.      Palpations: Abdomen is soft.      Tenderness: There is abdominal tenderness in the suprapubic area. There is no right CVA tenderness or left CVA tenderness.   Musculoskeletal:         General: Normal range of motion.      Comments: Moves all 4 extremities normally   Skin:     General: Skin is warm and dry.   Neurological:      Mental Status: She is alert and oriented to person, place, and time.   Psychiatric:         Behavior: Behavior normal.         Thought Content: Thought content normal.                             Assessment & Plan        1. Acute cystitis with " hematuria  cefdinir (OMNICEF) 300 MG Cap    phenazopyridine (PYRIDIUM) 200 MG Tab      2. Lower urinary tract symptoms (LUTS)  POCT Urinalysis    POCT Pregnancy    URINE CULTURE(NEW)        Urine with positive nitrites/leuks/blood.  Cefdinir/pyridium.  Culture.  Push fluids.  Differential diagnosis, natural history, supportive care, and indications for immediate follow-up were discussed.

## 2024-05-09 LAB
BACTERIA UR CULT: ABNORMAL
SIGNIFICANT IND 70042: ABNORMAL
SITE SITE: ABNORMAL
SOURCE SOURCE: ABNORMAL

## 2024-05-20 ENCOUNTER — TELEPHONE (OUTPATIENT)
Dept: URGENT CARE | Facility: PHYSICIAN GROUP | Age: 29
End: 2024-05-20
Payer: COMMERCIAL

## 2024-05-20 ENCOUNTER — OFFICE VISIT (OUTPATIENT)
Dept: URGENT CARE | Facility: PHYSICIAN GROUP | Age: 29
End: 2024-05-20
Payer: COMMERCIAL

## 2024-05-20 VITALS
TEMPERATURE: 97.8 F | HEIGHT: 65 IN | OXYGEN SATURATION: 99 % | DIASTOLIC BLOOD PRESSURE: 70 MMHG | HEART RATE: 66 BPM | BODY MASS INDEX: 36.65 KG/M2 | WEIGHT: 220 LBS | SYSTOLIC BLOOD PRESSURE: 116 MMHG | RESPIRATION RATE: 17 BRPM

## 2024-05-20 DIAGNOSIS — N30.01 ACUTE CYSTITIS WITH HEMATURIA: ICD-10-CM

## 2024-05-20 LAB
APPEARANCE UR: CLEAR
BILIRUB UR STRIP-MCNC: NORMAL MG/DL
COLOR UR AUTO: YELLOW
GLUCOSE UR STRIP.AUTO-MCNC: NORMAL MG/DL
KETONES UR STRIP.AUTO-MCNC: NORMAL MG/DL
LEUKOCYTE ESTERASE UR QL STRIP.AUTO: NORMAL
NITRITE UR QL STRIP.AUTO: NORMAL
PH UR STRIP.AUTO: 7 [PH] (ref 5–8)
POCT INT CON NEG: NEGATIVE
POCT INT CON POS: POSITIVE
POCT URINE PREGNANCY TEST: NEGATIVE
PROT UR QL STRIP: NORMAL MG/DL
RBC UR QL AUTO: NORMAL
SP GR UR STRIP.AUTO: 1.02
UROBILINOGEN UR STRIP-MCNC: 0.2 MG/DL

## 2024-05-20 PROCEDURE — 81025 URINE PREGNANCY TEST: CPT | Performed by: FAMILY MEDICINE

## 2024-05-20 PROCEDURE — 3074F SYST BP LT 130 MM HG: CPT | Performed by: FAMILY MEDICINE

## 2024-05-20 PROCEDURE — 81002 URINALYSIS NONAUTO W/O SCOPE: CPT | Performed by: FAMILY MEDICINE

## 2024-05-20 PROCEDURE — 3078F DIAST BP <80 MM HG: CPT | Performed by: FAMILY MEDICINE

## 2024-05-20 PROCEDURE — 99213 OFFICE O/P EST LOW 20 MIN: CPT | Performed by: FAMILY MEDICINE

## 2024-05-20 RX ORDER — CEFDINIR 300 MG/1
300 CAPSULE ORAL 2 TIMES DAILY
Qty: 10 CAPSULE | Refills: 0 | Status: SHIPPED | OUTPATIENT
Start: 2024-05-20 | End: 2024-05-25

## 2024-05-20 NOTE — TELEPHONE ENCOUNTER
Komal called the [facility] this morning in follow up from her 05/07/24 visit for UTI. She is still having symptoms. Advised patient that she will need to return for repeat eval. She understood.

## 2024-05-22 ASSESSMENT — ENCOUNTER SYMPTOMS
EYE REDNESS: 0
MYALGIAS: 0
VOMITING: 0
NAUSEA: 0
EYE DISCHARGE: 0
WEIGHT LOSS: 0

## 2024-05-22 NOTE — PROGRESS NOTES
"Subjective     Komal Carranza is a 28 y.o. female who presents with UTI (Symptoms Originally started 3 weeks, UTI symptoms persisting. )            Seen 5/7/2024 with acute cystitis.  Symptoms improved but did not resolve with cefdinir.  No fever.  No flank pain no vaginal symptoms.  No other aggravating or alleviating factors.        Review of Systems   Constitutional:  Negative for malaise/fatigue and weight loss.   Eyes:  Negative for discharge and redness.   Gastrointestinal:  Negative for nausea and vomiting.   Musculoskeletal:  Negative for joint pain and myalgias.   Skin:  Negative for itching and rash.              Objective     /70 (BP Location: Left arm, Patient Position: Sitting, BP Cuff Size: Large adult)   Pulse 66   Temp 36.6 °C (97.8 °F) (Temporal)   Resp 17   Ht 1.651 m (5' 5\") Comment: pt stated  Wt 99.8 kg (220 lb) Comment: pt stated  SpO2 99%   BMI 36.61 kg/m²      Physical Exam  Constitutional:       General: She is not in acute distress.     Appearance: She is well-developed.   HENT:      Head: Normocephalic and atraumatic.   Eyes:      Conjunctiva/sclera: Conjunctivae normal.   Cardiovascular:      Rate and Rhythm: Normal rate and regular rhythm.      Heart sounds: Normal heart sounds. No murmur heard.  Pulmonary:      Effort: Pulmonary effort is normal.      Breath sounds: Normal breath sounds. No wheezing.   Abdominal:      Palpations: Abdomen is soft.      Tenderness: There is no abdominal tenderness. There is no right CVA tenderness or left CVA tenderness.   Skin:     General: Skin is warm and dry.      Findings: No rash.   Neurological:      Mental Status: She is alert.                             Assessment & Plan   UA reviewed.  Pregnancy negative.  Urine culture 5/7/2024 reviewed     1. Acute cystitis with hematuria    - cefdinir (OMNICEF) 300 MG Cap; Take 1 Capsule by mouth 2 times a day for 5 days.  Dispense: 10 Capsule; Refill: 0  - POCT PREGNANCY  - POCT " Urinalysis    Differential diagnosis, natural history, supportive care, and indications for immediate follow-up were discussed.

## 2024-12-12 ENCOUNTER — HOSPITAL ENCOUNTER (OUTPATIENT)
Dept: RADIOLOGY | Facility: MEDICAL CENTER | Age: 29
End: 2024-12-12
Payer: COMMERCIAL

## 2024-12-12 ENCOUNTER — OFFICE VISIT (OUTPATIENT)
Dept: URGENT CARE | Facility: PHYSICIAN GROUP | Age: 29
End: 2024-12-12
Payer: COMMERCIAL

## 2024-12-12 ENCOUNTER — HOSPITAL ENCOUNTER (OUTPATIENT)
Facility: MEDICAL CENTER | Age: 29
End: 2024-12-12
Payer: COMMERCIAL

## 2024-12-12 VITALS
HEART RATE: 107 BPM | WEIGHT: 213 LBS | SYSTOLIC BLOOD PRESSURE: 116 MMHG | HEIGHT: 65 IN | BODY MASS INDEX: 35.49 KG/M2 | TEMPERATURE: 97.7 F | DIASTOLIC BLOOD PRESSURE: 76 MMHG | OXYGEN SATURATION: 94 % | RESPIRATION RATE: 14 BRPM

## 2024-12-12 DIAGNOSIS — N12 PYELONEPHRITIS: ICD-10-CM

## 2024-12-12 DIAGNOSIS — R39.9 UTI SYMPTOMS: ICD-10-CM

## 2024-12-12 DIAGNOSIS — N20.0 RENAL CALCULI: ICD-10-CM

## 2024-12-12 DIAGNOSIS — R10.9 FLANK PAIN: ICD-10-CM

## 2024-12-12 DIAGNOSIS — R30.0 DYSURIA: ICD-10-CM

## 2024-12-12 DIAGNOSIS — R39.9 UTI SYMPTOMS: Primary | ICD-10-CM

## 2024-12-12 LAB
APPEARANCE UR: NORMAL
BILIRUB UR STRIP-MCNC: NEGATIVE MG/DL
COLOR UR AUTO: NORMAL
GLUCOSE UR STRIP.AUTO-MCNC: NEGATIVE MG/DL
KETONES UR STRIP.AUTO-MCNC: NORMAL MG/DL
LEUKOCYTE ESTERASE UR QL STRIP.AUTO: NORMAL
NITRITE UR QL STRIP.AUTO: POSITIVE
PH UR STRIP.AUTO: 5.5 [PH] (ref 5–8)
POCT INT CON NEG: NEGATIVE
POCT INT CON POS: POSITIVE
POCT URINE PREGNANCY TEST: NEGATIVE
PROT UR QL STRIP: NORMAL MG/DL
RBC UR QL AUTO: NORMAL
SP GR UR STRIP.AUTO: 1.01
UROBILINOGEN UR STRIP-MCNC: 0.2 MG/DL

## 2024-12-12 PROCEDURE — 3074F SYST BP LT 130 MM HG: CPT

## 2024-12-12 PROCEDURE — 87186 SC STD MICRODIL/AGAR DIL: CPT

## 2024-12-12 PROCEDURE — 87077 CULTURE AEROBIC IDENTIFY: CPT

## 2024-12-12 PROCEDURE — 99214 OFFICE O/P EST MOD 30 MIN: CPT

## 2024-12-12 PROCEDURE — 74176 CT ABD & PELVIS W/O CONTRAST: CPT

## 2024-12-12 PROCEDURE — 87086 URINE CULTURE/COLONY COUNT: CPT

## 2024-12-12 PROCEDURE — 81025 URINE PREGNANCY TEST: CPT

## 2024-12-12 PROCEDURE — 81002 URINALYSIS NONAUTO W/O SCOPE: CPT

## 2024-12-12 PROCEDURE — 3078F DIAST BP <80 MM HG: CPT

## 2024-12-12 RX ORDER — CIPROFLOXACIN 500 MG/1
500 TABLET, FILM COATED ORAL 2 TIMES DAILY
Qty: 10 TABLET | Refills: 0 | Status: SHIPPED | OUTPATIENT
Start: 2024-12-12 | End: 2024-12-17

## 2024-12-12 RX ORDER — TAMSULOSIN HYDROCHLORIDE 0.4 MG/1
0.4 CAPSULE ORAL DAILY
Qty: 7 CAPSULE | Refills: 0 | Status: SHIPPED | OUTPATIENT
Start: 2024-12-12

## 2024-12-12 ASSESSMENT — ENCOUNTER SYMPTOMS
WEIGHT LOSS: 0
ABDOMINAL PAIN: 0
DIAPHORESIS: 0
DIARRHEA: 0
FEVER: 1
VOMITING: 0
CHILLS: 1

## 2024-12-12 NOTE — PROGRESS NOTES
Subjective:   Komal Carranza is a 29 y.o. female who presents for UTI (Painful urination/Body aches /Hot/cold flash/Nausea/Going on for roughly 2 days  )      HPI: Patient reports dysuria, blood in urine, frequency x2 days. Reports body aches, chills, subjective fevers, nausea. Reports 10/10 back pain 2 days ago, 5/10 in clinic. Patient has history of recurrent UTIs, most recent in 24. Patient was prescribed Cefdinir in the past, states that It provided some relief but not full resolution of symptoms.  Patient also requesting to rule out kidney stone as her symptoms are similar to what she experienced in the past.    Review of Systems   Constitutional:  Positive for chills and fever. Negative for diaphoresis and weight loss.   Gastrointestinal:  Negative for abdominal pain, diarrhea and vomiting.   Genitourinary:  Positive for dysuria and frequency.   All other systems reviewed and are negative.      Medications:    docusate sodium Caps  ferrous sulfate  folic acid Tabs  ibuprofen Tabs  phenazopyridine Tabs  PRENATAL PO    Allergies: Food - pork    Problem List: Komal Carranza does not have any pertinent problems on file.    Surgical History:  Past Surgical History:   Procedure Laterality Date    [place]  DELIVERY ONLY  2020    Procedure:  SECTION, PRIMARY;  Surgeon: Gareth Bolden M.D.;  Location: LABOR AND DELIVERY;  Service: Labor and Delivery    TONSILLECTOMY AND ADENOIDECTOMY         Past Social Hx: Komal Carranza  reports that she has never smoked. She has never used smokeless tobacco. She reports that she does not currently use alcohol. She reports that she does not use drugs.     Past Family Hx:  Komal Carranza family history is not on file.     Problem list, medications, and allergies reviewed by myself today in Epic.     Objective:     /76 (BP Location: Left arm, Patient Position: Sitting, BP Cuff Size: Adult)   Pulse (!) 107   Temp 36.5 °C (97.7 °F) (Temporal)   Resp 14  "  Ht 1.651 m (5' 5\")   Wt 96.6 kg (213 lb)   SpO2 94%   BMI 35.45 kg/m²     Physical Exam  Constitutional:       General: She is not in acute distress.     Appearance: She is not ill-appearing.   HENT:      Nose: Nose normal.   Eyes:      Pupils: Pupils are equal, round, and reactive to light.   Cardiovascular:      Rate and Rhythm: Normal rate and regular rhythm.      Pulses: Normal pulses.   Pulmonary:      Effort: Pulmonary effort is normal.      Breath sounds: Normal breath sounds.   Abdominal:      General: Bowel sounds are normal.      Palpations: Abdomen is soft.      Tenderness: There is right CVA tenderness and left CVA tenderness.   Genitourinary:     Comments: Mild suprapubic tenderness  Musculoskeletal:      Cervical back: Normal range of motion.   Skin:     General: Skin is warm and dry.   Neurological:      Mental Status: She is alert.   Psychiatric:         Mood and Affect: Mood normal.          Assessment/Plan:     Diagnosis and associated orders:     1. UTI symptoms  POCT Urinalysis    POCT PREGNANCY    URINE CULTURE(NEW)    CANCELED: URINE CULTURE(NEW)      2. Dysuria  POCT PREGNANCY    URINE CULTURE(NEW)    CANCELED: URINE CULTURE(NEW)      3. Pyelonephritis  URINE CULTURE(NEW)    ciprofloxacin (CIPRO) 500 MG Tab    Referral to Urology      4. Flank pain  URINE CULTURE(NEW)    CT-RENAL COLIC EVALUATION(A/P W/O)      5. Renal calculi  tamsulosin (FLOMAX) 0.4 MG capsule    Referral to Urology         Comments/MDM:     UA positive for leukocyte esterase, nitrites, and moderate blood. Patient is slightly tachycardic today, recheck was 92 bpm, no fever noted in office,.patient is able to tolerate p.o. intake, reviewed most current labs the patient had completed from [facility]. Prescribed Ciprofloxacin.  Discussed black box warning associated with Cipro, patient verbalized understanding and agreeable to plan of care. STAT Renal CT scheduled.    Ordered renal CT - results show obstructing 5 mm calculus " in the mid to distal right ureter with moderate right hydronephrosis.     Discussed results over the phone. Provided patient with instructions for higher level of care, including going to the ED today given both diagnoses and symptoms may worsen leading to serious complications. We discussed potential sequelae associated with combination of renal stone and kidney infection, including urosepsis. Patient verbalized understanding. I also placed urgent referral to urology and ordered tamsulosin. Patient reports pain is manageable with OTC tylenol, recommend alternating with NSAIDs for possible ureteral spasm alleviation, and fluids.      12/17 - spoke with patient via phone call. She has a urology appointment scheduled. Discussed C&S results. Patient attests symptoms have fully resolved, declines need for longer course of treatment. Education provided and strict return precautions discussed.     Differential diagnosis, natural history, supportive care, and strict ED precautions discussed. Advised the patient to follow-up with PCP for recheck, reevaluation, and consideration of further management.    Paola Pena, SASHA, APRN, FNP-BC

## 2024-12-15 LAB
BACTERIA UR CULT: ABNORMAL
BACTERIA UR CULT: ABNORMAL
SIGNIFICANT IND 70042: ABNORMAL
SITE SITE: ABNORMAL
SOURCE SOURCE: ABNORMAL

## 2025-01-20 ENCOUNTER — OFFICE VISIT (OUTPATIENT)
Dept: UROLOGY | Facility: MEDICAL CENTER | Age: 30
End: 2025-01-20
Payer: COMMERCIAL

## 2025-01-20 ENCOUNTER — HOSPITAL ENCOUNTER (OUTPATIENT)
Dept: RADIOLOGY | Facility: MEDICAL CENTER | Age: 30
End: 2025-01-20
Attending: PHYSICIAN ASSISTANT
Payer: COMMERCIAL

## 2025-01-20 VITALS — TEMPERATURE: 98.1 F

## 2025-01-20 DIAGNOSIS — N20.0 RENAL CALCULI: ICD-10-CM

## 2025-01-20 DIAGNOSIS — N20.1 URETERIC STONE: Primary | ICD-10-CM

## 2025-01-20 LAB
APPEARANCE UR: CLEAR
BILIRUB UR STRIP-MCNC: NORMAL MG/DL
COLOR UR AUTO: YELLOW
GLUCOSE UR STRIP.AUTO-MCNC: NORMAL MG/DL
KETONES UR STRIP.AUTO-MCNC: NORMAL MG/DL
LEUKOCYTE ESTERASE UR QL STRIP.AUTO: NORMAL
NITRITE UR QL STRIP.AUTO: NORMAL
PH UR STRIP.AUTO: 6 [PH] (ref 5–8)
PROT UR QL STRIP: NORMAL MG/DL
RBC UR QL AUTO: NORMAL
SP GR UR STRIP.AUTO: 1.02
UROBILINOGEN UR STRIP-MCNC: 0.2 MG/DL

## 2025-01-20 PROCEDURE — 81002 URINALYSIS NONAUTO W/O SCOPE: CPT | Performed by: PHYSICIAN ASSISTANT

## 2025-01-20 PROCEDURE — 74176 CT ABD & PELVIS W/O CONTRAST: CPT

## 2025-01-20 PROCEDURE — 99204 OFFICE O/P NEW MOD 45 MIN: CPT | Performed by: PHYSICIAN ASSISTANT

## 2025-01-20 NOTE — PATIENT INSTRUCTIONS
General diet advice for stone formers:   1) Adequate fluid intake of 2 L (68 ounces) daily -- mostly water    2) Avoid adding salt to your foods and choose foods that have less than 40mg of salt per serving   3) Avoid excessive animal protein (no more than 6 ounces daily)   4) Incorporate citrus into fluids (lemon or lime slices/juice)    5) Maintain normal dairy intake

## 2025-01-20 NOTE — PROGRESS NOTES
Reason for visit:   5 mm mid to distal RIGHT ureteric stone    HPI   Komal Carranza is a 29 y.o. female presenting for urologic evaluation after presenting to acute care with RIGHT renal colic secondary to a 5 mm mid to distal RIGHT ureteric stone. At the time, the patient also had a culture proven E. Coli UTI. She was discharged home with PO antibiotics and tamsulosin 0.4 mg daily.    At the time, the patient presented with SP discomfort and RIGHT flank pain with associated nausea. She reports that she has not visibly seen passage of a kidney stone, but she has been pain free since diagnosis.     The patient reports that she does have a history of kidney stones, but has never seen a urologist. She has passed stones spontaneously and has never required surgery.    Lifelong non-smoker. Denies any family history of  CA.     She reports that she has had the occasional UTI. Prior to this episode, she was treated for a UTI 3 months earlier.     She denies any fevers/chills, nausea/vomiting, abdominal/flank pain and general feelings of being unwell.     She is currently TTC, but reports that she is not currently pregnant.     No past medical history on file.         Past Surgical History:   Procedure Laterality Date    [place]  DELIVERY ONLY  2020    Procedure:  SECTION, PRIMARY;  Surgeon: Gareth Bolden M.D.;  Location: LABOR AND DELIVERY;  Service: Labor and Delivery    TONSILLECTOMY AND ADENOIDECTOMY  2012         Urologic PMH:    Kidney stones     Family History:   Denies     Social History     Socioeconomic History    Marital status:      Spouse name: Not on file    Number of children: Not on file    Years of education: Not on file    Highest education level: Not on file   Occupational History    Not on file   Tobacco Use    Smoking status: Never    Smokeless tobacco: Never   Vaping Use    Vaping status: Never Used   Substance and Sexual Activity    Alcohol use: Not Currently    Drug use:  Never    Sexual activity: Not on file   Other Topics Concern    Not on file   Social History Narrative    Not on file     Social Drivers of Health     Financial Resource Strain: Not on file   Food Insecurity: Unknown (7/17/2020)    Hunger Vital Sign     Worried About Running Out of Food in the Last Year: Patient declined     Ran Out of Food in the Last Year: Patient declined   Transportation Needs: Unknown (7/17/2020)    PRAPARE - Transportation     Lack of Transportation (Medical): Patient declined     Lack of Transportation (Non-Medical): Patient declined   Physical Activity: Not on file   Stress: Not on file   Social Connections: Not on file   Intimate Partner Violence: Not on file   Housing Stability: Not on file         Allergies   Allergen Reactions    Food      PORK         Current Outpatient Medications   Medication Sig Dispense Refill    tamsulosin (FLOMAX) 0.4 MG capsule Take 1 Capsule by mouth every day. Take 0.4 mg once daily until stone passage occurs or up to 4 weeks. 7 Capsule 0    folic acid (FOLVITE) 1 MG Tab Take 1 mg by mouth every day. (Patient not taking: Reported on 12/12/2024)      phenazopyridine (PYRIDIUM) 200 MG Tab Take 1 Tablet by mouth 3 times a day as needed for Moderate Pain. (Patient not taking: Reported on 5/20/2024) 6 Tablet 0    ibuprofen (MOTRIN) 800 MG Tab Take 1 Tab by mouth every 8 hours as needed for Moderate Pain. (Patient not taking: Reported on 5/20/2024) 30 Tab 0    ferrous sulfate 325 (65 Fe) MG tablet Take 1 Tab by mouth every day. (Patient not taking: Reported on 5/20/2024) 30 Tab 0    docusate sodium (COLACE) 100 MG Cap Take 1 Cap by mouth 2 times a day. (Patient not taking: Reported on 5/20/2024) 60 Cap 0    Prenatal Vit-Fe Fumarate-FA (PRENATAL PO) Take  by mouth. (Patient not taking: Reported on 12/12/2024)       No current facility-administered medications for this visit.         Review of Systems:   GENERAL: No fever, chills, nausea, vomiting   RESPIRATORY: No  shortness of breath, wheezing, or cough   CARDIAC: No chest pain, palpitations, irregular heart rhythm, or chest pressure   GASTROINTESTINAL: No abdominal pain or distention    GENITROURINARY: See HPI   NEUROLOGIC: No weakness, blackouts, seizure or stroke   HEMATOLOGIC: No history of bleeding disorders, hypercoagulability, DVT, or PE   ENDOCRINE: No history of diabetes, adrenal disorders, or thyroid disorders    All other review of systems was negative     Vitals:    01/20/25 1425   Temp: 36.7 °C (98.1 °F)   TempSrc: Temporal         Physical Exam:   General:  Alert & Oriented, NAD   Skin: Warm/Dry, no evidence of infection or rash   HEENT: normocephalic   Thorax: No CVA tenderness   Abdomen: Soft, nontender to superficial/deep palpation, nondistended    Assessment and plan:     5 mm mid to distal RIGHT ureteric stone    -- CT A/P reviewed in detail with the patient -- calcification in the LEFT kidney parenchyma noted, obstructing 5 mm stone in mid to distal RIGHT ureter noted  -- Ensure good daily water intake -- at least 60-64 ounces   -- Continue tamsulosin 0.4 mg at bedtime until imaging confirms that the ureteric stone has passed spontaneously   -- Ibuprofen 600 mg PRN discomfort   -- POCT urinalysis reviewed -- no evidence of infection  -- Obtain STAT CT A/P for review -- I will call patient with results -- if stone persists, patient is aware that she will need a cystoscopy, RIGHT URS with LL, +/- RIGHT ureteric stent placement    I have educated the patient that should she develop any fevers/chills, nausea/vomiting, severe pain that is not controlled by over-the-counter pain medications and general feelings of being unwell that she should call the office urgently or seek urgent evaluation if it is after hours.?     I personally reviewed the patient's pertinent medical records and labs/images available to me.     Raj Muñiz PA-C

## 2025-01-22 ENCOUNTER — TELEPHONE (OUTPATIENT)
Dept: UROLOGY | Facility: MEDICAL CENTER | Age: 30
End: 2025-01-22
Payer: COMMERCIAL

## 2025-01-22 DIAGNOSIS — N20.1 URETERIC STONE: Primary | ICD-10-CM

## 2025-01-22 RX ORDER — TAMSULOSIN HYDROCHLORIDE 0.4 MG/1
0.4 CAPSULE ORAL
Qty: 30 CAPSULE | Refills: 1 | Status: SHIPPED | OUTPATIENT
Start: 2025-01-22

## 2025-01-22 NOTE — TELEPHONE ENCOUNTER
Called patient to let her know that the ureteric stone has not passed. Plan for outpatient URS with LL. Continue tamsulosin 0.4 mg daily. Refill sent. Call with questions or concerns.    Raj Muñiz PA-C

## 2025-01-29 ENCOUNTER — APPOINTMENT (OUTPATIENT)
Dept: ADMISSIONS | Facility: MEDICAL CENTER | Age: 30
End: 2025-01-29
Attending: STUDENT IN AN ORGANIZED HEALTH CARE EDUCATION/TRAINING PROGRAM
Payer: COMMERCIAL

## 2025-02-04 ENCOUNTER — PRE-ADMISSION TESTING (OUTPATIENT)
Dept: ADMISSIONS | Facility: MEDICAL CENTER | Age: 30
End: 2025-02-04
Attending: STUDENT IN AN ORGANIZED HEALTH CARE EDUCATION/TRAINING PROGRAM
Payer: COMMERCIAL

## 2025-02-04 RX ORDER — CLOMIPHENE CITRATE 50 MG/1
50 TABLET ORAL DAILY
COMMUNITY

## 2025-02-04 RX ORDER — NAPROXEN SODIUM 220 MG/1
220 TABLET, FILM COATED ORAL 2 TIMES DAILY WITH MEALS
COMMUNITY

## 2025-02-04 RX ORDER — ACETAMINOPHEN 500 MG
500-1000 TABLET ORAL EVERY 6 HOURS PRN
COMMUNITY

## 2025-02-04 RX ORDER — IBUPROFEN 200 MG
200 TABLET ORAL EVERY 6 HOURS PRN
COMMUNITY

## 2025-02-04 NOTE — PREADMIT AVS NOTE
Current Medications   Medication Instructions    clomiPHENE (CLOMID) 50 MG tablet Hold medication day of procedure    acetaminophen (TYLENOL) 500 MG Tab As needed medication, may take if needed, including morning of procedure     ibuprofen (MOTRIN) 200 MG Tab Stop 5 days before surgery    naproxen (ALEVE) 220 MG tablet Stop 5 days before surgery    tamsulosin (FLOMAX) 0.4 MG capsule Continue taking medication as prescribed including night before procedure.    Prenatal Vit-Fe Fumarate-FA (PRENATAL PO) Stop 7 days before surgery

## 2025-02-04 NOTE — OR NURSING
RN tele pre admit appointment completed with patient:  Medication instructions given according to the Guideline for Pre-Operative Medication Management.  Patient aware medication instructions can be reviewed in the pre admit AVS note.    Preparing For Your Procedure packet reviewed with patient including fasting and bathing guidelines.  Patient instructed to stop taking all vitamins and herbal supplements 7 days prior to surgery and instructed to stop any NSAIDS 5 days prior to surgery unless otherwise instructed by medical provider. Procedure date 2/20/2025.    During pre admit appointment this RN encouraged patient to increase fluid intake the day prior to surgery including intake of electrolyte drinks such as Gatorade or electrolyte water and patient may have clear liquids until 2 hours prior to procedure.     Patient verbalizes understanding of all instructions given. No further questions at this time.

## 2025-02-20 ENCOUNTER — PHARMACY VISIT (OUTPATIENT)
Dept: PHARMACY | Facility: MEDICAL CENTER | Age: 30
End: 2025-02-20
Payer: COMMERCIAL

## 2025-02-20 ENCOUNTER — APPOINTMENT (OUTPATIENT)
Dept: RADIOLOGY | Facility: MEDICAL CENTER | Age: 30
End: 2025-02-20
Attending: STUDENT IN AN ORGANIZED HEALTH CARE EDUCATION/TRAINING PROGRAM
Payer: COMMERCIAL

## 2025-02-20 ENCOUNTER — ANESTHESIA (OUTPATIENT)
Dept: SURGERY | Facility: MEDICAL CENTER | Age: 30
End: 2025-02-20
Payer: COMMERCIAL

## 2025-02-20 ENCOUNTER — ANESTHESIA EVENT (OUTPATIENT)
Dept: SURGERY | Facility: MEDICAL CENTER | Age: 30
End: 2025-02-20
Payer: COMMERCIAL

## 2025-02-20 ENCOUNTER — HOSPITAL ENCOUNTER (OUTPATIENT)
Facility: MEDICAL CENTER | Age: 30
End: 2025-02-20
Attending: STUDENT IN AN ORGANIZED HEALTH CARE EDUCATION/TRAINING PROGRAM | Admitting: STUDENT IN AN ORGANIZED HEALTH CARE EDUCATION/TRAINING PROGRAM
Payer: COMMERCIAL

## 2025-02-20 VITALS
BODY MASS INDEX: 34.82 KG/M2 | SYSTOLIC BLOOD PRESSURE: 122 MMHG | OXYGEN SATURATION: 97 % | RESPIRATION RATE: 16 BRPM | HEART RATE: 70 BPM | DIASTOLIC BLOOD PRESSURE: 71 MMHG | HEIGHT: 65 IN | TEMPERATURE: 97 F | WEIGHT: 209 LBS

## 2025-02-20 LAB
HCG UR QL: NEGATIVE
PATHOLOGY CONSULT NOTE: NORMAL

## 2025-02-20 PROCEDURE — 52356 CYSTO/URETERO W/LITHOTRIPSY: CPT | Mod: RT | Performed by: STUDENT IN AN ORGANIZED HEALTH CARE EDUCATION/TRAINING PROGRAM

## 2025-02-20 PROCEDURE — 700105 HCHG RX REV CODE 258: Performed by: STUDENT IN AN ORGANIZED HEALTH CARE EDUCATION/TRAINING PROGRAM

## 2025-02-20 PROCEDURE — 160009 HCHG ANES TIME/MIN: Performed by: STUDENT IN AN ORGANIZED HEALTH CARE EDUCATION/TRAINING PROGRAM

## 2025-02-20 PROCEDURE — 160046 HCHG PACU - 1ST 60 MINS PHASE II: Performed by: STUDENT IN AN ORGANIZED HEALTH CARE EDUCATION/TRAINING PROGRAM

## 2025-02-20 PROCEDURE — 160039 HCHG SURGERY MINUTES - EA ADDL 1 MIN LEVEL 3: Performed by: STUDENT IN AN ORGANIZED HEALTH CARE EDUCATION/TRAINING PROGRAM

## 2025-02-20 PROCEDURE — 160028 HCHG SURGERY MINUTES - 1ST 30 MINS LEVEL 3: Performed by: STUDENT IN AN ORGANIZED HEALTH CARE EDUCATION/TRAINING PROGRAM

## 2025-02-20 PROCEDURE — RXMED WILLOW AMBULATORY MEDICATION CHARGE: Performed by: STUDENT IN AN ORGANIZED HEALTH CARE EDUCATION/TRAINING PROGRAM

## 2025-02-20 PROCEDURE — C2617 STENT, NON-COR, TEM W/O DEL: HCPCS | Performed by: STUDENT IN AN ORGANIZED HEALTH CARE EDUCATION/TRAINING PROGRAM

## 2025-02-20 PROCEDURE — 88300 SURGICAL PATH GROSS: CPT | Performed by: PATHOLOGY

## 2025-02-20 PROCEDURE — 700111 HCHG RX REV CODE 636 W/ 250 OVERRIDE (IP): Mod: JZ | Performed by: ANESTHESIOLOGY

## 2025-02-20 PROCEDURE — 160025 RECOVERY II MINUTES (STATS): Performed by: STUDENT IN AN ORGANIZED HEALTH CARE EDUCATION/TRAINING PROGRAM

## 2025-02-20 PROCEDURE — 160048 HCHG OR STATISTICAL LEVEL 1-5: Performed by: STUDENT IN AN ORGANIZED HEALTH CARE EDUCATION/TRAINING PROGRAM

## 2025-02-20 PROCEDURE — C1769 GUIDE WIRE: HCPCS | Performed by: STUDENT IN AN ORGANIZED HEALTH CARE EDUCATION/TRAINING PROGRAM

## 2025-02-20 PROCEDURE — 74018 RADEX ABDOMEN 1 VIEW: CPT

## 2025-02-20 PROCEDURE — 700101 HCHG RX REV CODE 250: Performed by: ANESTHESIOLOGY

## 2025-02-20 PROCEDURE — 82365 CALCULUS SPECTROSCOPY: CPT

## 2025-02-20 PROCEDURE — 160015 HCHG STAT PREOP MINUTES: Performed by: STUDENT IN AN ORGANIZED HEALTH CARE EDUCATION/TRAINING PROGRAM

## 2025-02-20 PROCEDURE — 160002 HCHG RECOVERY MINUTES (STAT): Performed by: STUDENT IN AN ORGANIZED HEALTH CARE EDUCATION/TRAINING PROGRAM

## 2025-02-20 PROCEDURE — 88300 SURGICAL PATH GROSS: CPT | Mod: 26 | Performed by: PATHOLOGY

## 2025-02-20 PROCEDURE — 110371 HCHG SHELL REV 272: Performed by: STUDENT IN AN ORGANIZED HEALTH CARE EDUCATION/TRAINING PROGRAM

## 2025-02-20 PROCEDURE — 160035 HCHG PACU - 1ST 60 MINS PHASE I: Performed by: STUDENT IN AN ORGANIZED HEALTH CARE EDUCATION/TRAINING PROGRAM

## 2025-02-20 PROCEDURE — 81025 URINE PREGNANCY TEST: CPT

## 2025-02-20 DEVICE — STENT UROLOGICAL POLARIS 6X24 ULTRA: Type: IMPLANTABLE DEVICE | Site: URETER | Status: FUNCTIONAL

## 2025-02-20 RX ORDER — KETOROLAC TROMETHAMINE 15 MG/ML
INJECTION, SOLUTION INTRAMUSCULAR; INTRAVENOUS PRN
Status: DISCONTINUED | OUTPATIENT
Start: 2025-02-20 | End: 2025-02-20 | Stop reason: SURG

## 2025-02-20 RX ORDER — CEFAZOLIN SODIUM 1 G/3ML
INJECTION, POWDER, FOR SOLUTION INTRAMUSCULAR; INTRAVENOUS PRN
Status: DISCONTINUED | OUTPATIENT
Start: 2025-02-20 | End: 2025-02-20 | Stop reason: SURG

## 2025-02-20 RX ORDER — SODIUM CHLORIDE, SODIUM LACTATE, POTASSIUM CHLORIDE, CALCIUM CHLORIDE 600; 310; 30; 20 MG/100ML; MG/100ML; MG/100ML; MG/100ML
INJECTION, SOLUTION INTRAVENOUS CONTINUOUS
Status: DISCONTINUED | OUTPATIENT
Start: 2025-02-20 | End: 2025-02-20 | Stop reason: HOSPADM

## 2025-02-20 RX ORDER — DEXAMETHASONE SODIUM PHOSPHATE 4 MG/ML
INJECTION, SOLUTION INTRA-ARTICULAR; INTRALESIONAL; INTRAMUSCULAR; INTRAVENOUS; SOFT TISSUE PRN
Status: DISCONTINUED | OUTPATIENT
Start: 2025-02-20 | End: 2025-02-20 | Stop reason: SURG

## 2025-02-20 RX ORDER — ONDANSETRON 2 MG/ML
4 INJECTION INTRAMUSCULAR; INTRAVENOUS
Status: DISCONTINUED | OUTPATIENT
Start: 2025-02-20 | End: 2025-02-20 | Stop reason: HOSPADM

## 2025-02-20 RX ORDER — DIPHENHYDRAMINE HYDROCHLORIDE 50 MG/ML
12.5 INJECTION INTRAMUSCULAR; INTRAVENOUS
Status: DISCONTINUED | OUTPATIENT
Start: 2025-02-20 | End: 2025-02-20 | Stop reason: HOSPADM

## 2025-02-20 RX ORDER — MIDAZOLAM HYDROCHLORIDE 1 MG/ML
INJECTION INTRAMUSCULAR; INTRAVENOUS PRN
Status: DISCONTINUED | OUTPATIENT
Start: 2025-02-20 | End: 2025-02-20 | Stop reason: SURG

## 2025-02-20 RX ORDER — OXYBUTYNIN CHLORIDE 10 MG/1
10 TABLET, EXTENDED RELEASE ORAL DAILY
Qty: 5 TABLET | Refills: 0 | Status: SHIPPED | OUTPATIENT
Start: 2025-02-20 | End: 2025-02-25

## 2025-02-20 RX ORDER — ONDANSETRON 2 MG/ML
INJECTION INTRAMUSCULAR; INTRAVENOUS PRN
Status: DISCONTINUED | OUTPATIENT
Start: 2025-02-20 | End: 2025-02-20 | Stop reason: SURG

## 2025-02-20 RX ORDER — TAMSULOSIN HYDROCHLORIDE 0.4 MG/1
0.4 CAPSULE ORAL
Qty: 14 CAPSULE | Refills: 0 | Status: SHIPPED | OUTPATIENT
Start: 2025-02-20 | End: 2025-03-06

## 2025-02-20 RX ORDER — OXYCODONE HCL 5 MG/5 ML
10 SOLUTION, ORAL ORAL
Status: DISCONTINUED | OUTPATIENT
Start: 2025-02-20 | End: 2025-02-20 | Stop reason: HOSPADM

## 2025-02-20 RX ORDER — PHENAZOPYRIDINE HYDROCHLORIDE 200 MG/1
200 TABLET, FILM COATED ORAL 3 TIMES DAILY PRN
Qty: 6 TABLET | Refills: 0 | Status: SHIPPED | OUTPATIENT
Start: 2025-02-20 | End: 2025-02-22

## 2025-02-20 RX ORDER — LIDOCAINE HYDROCHLORIDE 20 MG/ML
INJECTION, SOLUTION EPIDURAL; INFILTRATION; INTRACAUDAL; PERINEURAL PRN
Status: DISCONTINUED | OUTPATIENT
Start: 2025-02-20 | End: 2025-02-20 | Stop reason: SURG

## 2025-02-20 RX ORDER — OXYCODONE HCL 5 MG/5 ML
5 SOLUTION, ORAL ORAL
Status: DISCONTINUED | OUTPATIENT
Start: 2025-02-20 | End: 2025-02-20 | Stop reason: HOSPADM

## 2025-02-20 RX ORDER — HALOPERIDOL 5 MG/ML
1 INJECTION INTRAMUSCULAR
Status: DISCONTINUED | OUTPATIENT
Start: 2025-02-20 | End: 2025-02-20 | Stop reason: HOSPADM

## 2025-02-20 RX ADMIN — ONDANSETRON 4 MG: 2 INJECTION INTRAMUSCULAR; INTRAVENOUS at 11:13

## 2025-02-20 RX ADMIN — SODIUM CHLORIDE, POTASSIUM CHLORIDE, SODIUM LACTATE AND CALCIUM CHLORIDE: 600; 310; 30; 20 INJECTION, SOLUTION INTRAVENOUS at 11:07

## 2025-02-20 RX ADMIN — CEFAZOLIN 2 G: 1 INJECTION, POWDER, FOR SOLUTION INTRAMUSCULAR; INTRAVENOUS at 11:22

## 2025-02-20 RX ADMIN — PROPOFOL 200 MG: 10 INJECTION, EMULSION INTRAVENOUS at 11:13

## 2025-02-20 RX ADMIN — FENTANYL CITRATE 100 MCG: 50 INJECTION, SOLUTION INTRAMUSCULAR; INTRAVENOUS at 11:13

## 2025-02-20 RX ADMIN — DEXAMETHASONE SODIUM PHOSPHATE 4 MG: 4 INJECTION INTRA-ARTICULAR; INTRALESIONAL; INTRAMUSCULAR; INTRAVENOUS; SOFT TISSUE at 11:13

## 2025-02-20 RX ADMIN — MIDAZOLAM HYDROCHLORIDE 4 MG: 1 INJECTION, SOLUTION INTRAMUSCULAR; INTRAVENOUS at 11:13

## 2025-02-20 RX ADMIN — KETOROLAC TROMETHAMINE 15 MG: 15 INJECTION, SOLUTION INTRAMUSCULAR; INTRAVENOUS at 11:13

## 2025-02-20 RX ADMIN — LIDOCAINE HYDROCHLORIDE 100 MG: 20 INJECTION, SOLUTION EPIDURAL; INFILTRATION; INTRACAUDAL; PERINEURAL at 11:13

## 2025-02-20 NOTE — ANESTHESIA PROCEDURE NOTES
Airway    Date/Time: 2/20/2025 11:21 AM    Performed by: Andrew Roberts M.D.  Authorized by: Andrew Roberts M.D.    Location:  OR  Urgency:  Elective  Indications for Airway Management:  Anesthesia      Spontaneous Ventilation: absent    Sedation Level:  Deep  Preoxygenated: Yes    Final Airway Type:  Supraglottic airway  Final Supraglottic Airway:  Standard LMA    SGA Size:  3  Number of Attempts at Approach:  1

## 2025-02-20 NOTE — ANESTHESIA PREPROCEDURE EVALUATION
Case: [id number] Date/Time: 02/20/25 1100    Procedures:       CYSTOSCOPY, RIGHT URETEROSCOPY, LASER LITHOTRIPSY, POSSIBE RIGHT URETERIC STENT PLACEMENT      URETEROSCOPY      LITHOTRIPSY, USING LASER    Pre-op diagnosis: URETERIC STONE    Location: [facility] OR [facility] / SURGERY [facility]    Surgeons: Melchor Florez M.D.            Relevant Problems   No relevant active problems       Physical Exam    Airway   Mallampati: II  TM distance: >3 FB  Neck ROM: full       Cardiovascular - normal exam  Rhythm: regular  Rate: normal  (-) murmur     Dental - normal exam           Pulmonary - normal exam  Breath sounds clear to auscultation     Abdominal    Neurological - normal exam                   Anesthesia Plan    ASA 2       Plan - general       Airway plan will be LMA          Induction: intravenous    Postoperative Plan: Postoperative administration of opioids is intended.    Pertinent diagnostic labs and testing reviewed    Informed Consent:    Anesthetic plan and risks discussed with patient.    Use of blood products discussed with: patient whom consented to blood products.

## 2025-02-20 NOTE — DISCHARGE INSTRUCTIONS
HOME CARE INSTRUCTIONS    ACTIVITY: Rest and take it easy for the first 24 hours.  A responsible adult is recommended to remain with you during that time.  It is normal to feel sleepy.  We encourage you to not do anything that requires balance, judgment or coordination.    FOR 24 HOURS DO NOT:  Drive, operate machinery or run household appliances.  Drink beer or alcoholic beverages.  Make important decisions or sign legal documents.    DIET: To avoid nausea, slowly advance diet as tolerated, avoiding spicy or greasy foods for the first day.  Add more substantial food to your diet according to your physician's instructions.  Babies can be fed formula or breast milk as soon as they are hungry.  INCREASE FLUIDS AND FIBER TO AVOID CONSTIPATION.    MEDICATIONS: Resume taking daily medication.  Take prescribed pain medication with food.  If no medication is prescribed, you may take non-aspirin pain medication if needed.  PAIN MEDICATION CAN BE VERY CONSTIPATING.  Take a stool softener or laxative such as senokot, pericolace, or milk of magnesia if needed.    Prescription given for Flomax, Pyridium, Oxybutynin SR to [facility]    A follow-up appointment is scheduled on February 24 at 11:30 a.m.    You should CALL YOUR PHYSICIAN if you develop:  Fever greater than 101 degrees F.  Pain not relieved by medication, or persistent nausea or vomiting.  Excessive bleeding (blood soaking through dressing) or unexpected drainage from the wound.  Extreme redness or swelling around the incision site, drainage of pus or foul smelling drainage.  Inability to urinate or empty your bladder within 8 hours.  Problems with breathing or chest pain.    You should call 911 if you develop problems with breathing or chest pain.  If you are unable to contact your doctor or surgical center, you should go to the nearest emergency room or urgent care center.  Physician's telephone #: [phone] Dr. Florez    MILD FLU-LIKE SYMPTOMS ARE NORMAL.  YOU MAY  EXPERIENCE GENERALIZED MUSCLE ACHES, THROAT IRRITATION, HEADACHE AND/OR SOME NAUSEA.    If any questions arise, call your doctor.  If your doctor is not available, please feel free to call the Surgical Center at [phone].  The Center is open Monday through Friday from 7AM to 7PM.      A registered nurse may call you a few days after your surgery to see how you are doing after your procedure.    You may also receive a survey in the mail within the next two weeks and we ask that you take a few moments to complete the survey and return it to us.  Our goal is to provide you with very good care and we value your comments.     Depression / Suicide Risk    [facility], it is important to learn how to keep safe from harming yourself.    Recognize the warning signs:  Abrupt changes in personality, positive or negative- including increase in energy   Giving away possessions  Change in eating patterns- significant weight changes-  positive or negative  Change in sleeping patterns- unable to sleep or sleeping all the time   Unwillingness or inability to communicate  Depression  Unusual sadness, discouragement and loneliness  Talk of wanting to die  Neglect of personal appearance   Rebelliousness- reckless behavior  Withdrawal from people/activities they love  Confusion- inability to concentrate     If you or a loved one observes any of these behaviors or has concerns about self-harm, here's what you can do:  Talk about it- your feelings and reasons for harming yourself  Remove any means that you might use to hurt yourself (examples: pills, rope, extension cords, firearm)  Get professional help from the community (Mental Health, Substance Abuse, psychological counseling)  Do not be alone:Call your Safe Contact- someone whom you trust who will be there for you.  Call your local CRISIS HOTLINE 575-9227 or 195-978-7182  Call your local Children's Mobile Crisis Response Team [place] (440) 375-1926 or www.[employer]  Call the toll free National Suicide Prevention Hotlines   National Suicide Prevention Lifeline 172-996-AODH (5415)  [facility] Line Network 800-SUICIDE (824-2767)    I acknowledge receipt and understanding of these Home Care instructions.

## 2025-02-20 NOTE — OR NURSING
1307 Report received from discharge charge rn and patient arrived to phase 2. AO x 4.    1315 Vital signs stable. Pain level 0/10, no complaints of n/v.  Up to restroom, steady gait, + void, blood when wiping, no clots.     1358 Patient states ready to go home.  VSS, patient has all belongings. IV removed.  Patient dressed.  Discharge instructions discussed with patient and family.  Questions answered. Patient and family verbalized understanding.  Rx electronically sent to patient's pharmacy and picked up, provided to patient. Patient taken out with wheelchair and all belongings.

## 2025-02-20 NOTE — OP REPORT
SURGEON: Dr. Melchor Florez      ANESTHESIA: General (general endotracheal tube)      PRE-OPERATIVE DIAGNOSIS: right ureteral stone    POST-OPERATIVE DIAGNOSIS: Same      NAME OF PROCEDURE: Cystoscopy, right ureteroscopy with laser lithotripsy, stone basket extraction, physician interpretation of fluoroscopy total time <1 hour, and 6Fr 26 cm JJ ureteral stent placement on a string    FINDINGS OF PROCEDURE:     Mildly impacted right ureteral stone, successful laser lithotripsy and stone basket extraction  Placement of right 6x26 JJ ureteral stent on a string placement     EBL: 2 cc      COMPLICATIONS: None      PATIENT CONDITION: stable      INDICATIONS: Komal Carranza is a 29 y.o. female who agreed to above procedure for further management of kidney stones after complete discussion of risks, benefits, and alternatives.      PROCEDURE:     After informed consent was obtained in the preoperative care unit, the patient was taken to the OR on a stretcher. The patient was properly identified and placed in supine position per OR protocol. The patient was given a prophylactic dose of ancef 2 grams. General (general endotracheal tube) was administered. The patient was then placed in dorsal lithotomy, prepped and draped in a standard sterile fashion.  A timeout was performed with all parties in agreement.       A 22Fr rigid cystoscope was inserted per urethra. A full inspection of the bladder was completed. There were no lesions or masses, the UOs were in orthotopic position effluxing clear urine. Two sensor wire was passed into the right ureteral orifice up to the level of the kidney, confirmed under fluoroscopy.     The long semi-rigid ureteroscope was assembled and was advanced along the urethra and into the bladder under direct vision. This was attempted to be advanced into the right ureteral orifice, however, with two wires in place in could not enter the ureteral orifice. One wire was removed and the ureteroscope was  then advanced up to the level of the stone. I proceeded with laser lithotripsy. The stone basket was used to remove any large remaining stone fragments.  The semi-rigid was removed under direct vision to perform a final ureteral survey. No ureteral injuries or remaining stone fragments were seen. One of the sensor wires was removed.      A [id number] JJ ureteral stent was passed over the remaining sensor wire and into the kidney, with it’s position confirmed under fluoroscopic guidance. The wire was removed and a good proximal and distal curl were noted in the renal pelvis and bladder respectively with fluoroscopy. I drained the patient's bladder. This concluded the procedure. The patient tolerated it well and was transferred to the PACU in stable condition.        DISPOSITION: The patient will be discharged home with plan for stent on a tether removal in 4 days .

## 2025-02-20 NOTE — ANESTHESIA TIME REPORT
Anesthesia Start and Stop Event Times       Date Time Event    2/20/2025 1052 Ready for Procedure     1113 Anesthesia Start     1218 Anesthesia Stop          Responsible Staff  02/20/25      Name Role Begin End    Andrew Roberts M.D. Anesth 1113 1218          Overtime Reason:  no overtime (within assigned shift)    Comments:                                                           Gabapentin Counseling: I discussed with the patient the risks of gabapentin including but not limited to dizziness, somnolence, fatigue and ataxia.

## 2025-02-20 NOTE — ANESTHESIA POSTPROCEDURE EVALUATION
Patient: Komal Carranza    Procedure Summary       Date: 02/20/25 Room / Location: [place] [address] / SURGERY [facility]    Anesthesia Start: 1113 Anesthesia Stop: 1218    Procedure: CYSTOSCOPY, RIGHT URETEROSCOPY, LASER LITHOTRIPSY, RIGHT URETERIC STENT PLACEMENT (Right: Ureter) Diagnosis: (URETERIC STONE)    Surgeons: Melchor Florez M.D. Responsible Provider: Andrew Roberts M.D.    Anesthesia Type: general ASA Status: 2            Final Anesthesia Type: general  Last vitals  BP   Blood Pressure: 121/65    Temp   36.6 °C (97.8 °F)    Pulse   85   Resp   18    SpO2   92 %      Anesthesia Post Evaluation    Patient location during evaluation: PACU  Patient participation: complete - patient participated  Level of consciousness: awake and alert    Airway patency: patent  Anesthetic complications: no  Cardiovascular status: hemodynamically stable  Respiratory status: acceptable  Hydration status: euvolemic    PONV: none          There were no known notable events for this encounter.     Nurse Pain Score: 0 (NPRS)

## 2025-02-20 NOTE — OR NURSING
1158: Pt arrived via gurney with anesthesia and RN. Report received. See flowsheet for VS. Orders reviewed and initiated.   1233: Family updated  1245: Dr. Roberts notified of HR below baseline. Ok to proceed with discharge.   1302: Report called to Thu, RN. All questions answered. VSS. No patient distress. Alert and oriented x4. Pt transported to phase 2 in stable condition on room air with no personal belongings.

## 2025-02-21 ENCOUNTER — TELEPHONE (OUTPATIENT)
Dept: UROLOGY | Facility: MEDICAL CENTER | Age: 30
End: 2025-02-21
Payer: COMMERCIAL

## 2025-02-21 DIAGNOSIS — N20.1 URETERIC STONE: ICD-10-CM

## 2025-02-21 RX ORDER — OXYCODONE HYDROCHLORIDE 5 MG/1
5 TABLET ORAL EVERY 4 HOURS PRN
Qty: 18 TABLET | Refills: 0 | Status: SHIPPED | OUTPATIENT
Start: 2025-02-21 | End: 2025-02-24

## 2025-02-21 NOTE — TELEPHONE ENCOUNTER
VOICEMAIL  1. Caller Name: Komal Carranza                      Call Back Number: [phone]    2. Message: pt called and lm stating she has been taking all her 3 prescriptions and she is still in a lot of pain. She wants to know what you can do for her.    3. Patient approves office to leave a detailed voicemail/MyChart message: yes

## 2025-02-22 LAB
APPEARANCE STONE: NORMAL
COMPN STONE: NORMAL
SPECIMEN WT: 3 MG

## 2025-02-24 ENCOUNTER — OFFICE VISIT (OUTPATIENT)
Dept: UROLOGY | Facility: MEDICAL CENTER | Age: 30
End: 2025-02-24
Payer: COMMERCIAL

## 2025-02-24 DIAGNOSIS — N20.1 URETERIC STONE: ICD-10-CM

## 2025-02-24 PROCEDURE — 99999 PR NO CHARGE: CPT | Performed by: STUDENT IN AN ORGANIZED HEALTH CARE EDUCATION/TRAINING PROGRAM

## 2025-02-25 NOTE — PROGRESS NOTES
Patient presented to clinic for stent on a string removal. She tolerated this well and will follow up in 6 weeks with a Renal US

## 2025-04-03 ENCOUNTER — HOSPITAL ENCOUNTER (OUTPATIENT)
Dept: RADIOLOGY | Facility: MEDICAL CENTER | Age: 30
End: 2025-04-03
Attending: STUDENT IN AN ORGANIZED HEALTH CARE EDUCATION/TRAINING PROGRAM
Payer: COMMERCIAL

## 2025-04-03 DIAGNOSIS — N20.1 URETERIC STONE: ICD-10-CM

## 2025-04-03 PROCEDURE — 76775 US EXAM ABDO BACK WALL LIM: CPT

## 2025-04-21 ENCOUNTER — OFFICE VISIT (OUTPATIENT)
Dept: UROLOGY | Facility: MEDICAL CENTER | Age: 30
End: 2025-04-21
Payer: COMMERCIAL

## 2025-04-21 DIAGNOSIS — N20.0 RENAL CALCULI: ICD-10-CM

## 2025-04-21 PROCEDURE — 99213 OFFICE O/P EST LOW 20 MIN: CPT | Performed by: STUDENT IN AN ORGANIZED HEALTH CARE EDUCATION/TRAINING PROGRAM

## 2025-04-21 NOTE — PROGRESS NOTES
Subjective  CHIEF COMPLAINT:    Patient presents to the office today to discuss:    1. Post-stent removal follow-up s/p right ureteroscopy with laser lithotripsy  2. Left kidney calcification    PAST UROLOGICAL HISTORY:    Patient had a right-sided kidney stone requiring stent placement. Also has a calcification on the left kidney that was previously noted. Had an episode of possible pyelonephritis vs kidney stone approximately 2-3 years ago while in [place], which resolved with pain medication.     HPI TODAY 04/21/2025:    - Doing well since stent removal. Reports feeling relief.  - No blood in urine, no pain with urination, no abdominal or back pain.  - Ultrasound shows right kidney appears normal without hydronephrosis. Left kidney shows calcification consistent with previous CT findings.  - Left kidney calcification appears to be in the renal parenchyma rather than the collecting system, likely representing scarring from previous infection.    Family History   Problem Relation Age of Onset    Hypertension Mother     Hyperlipidemia Mother     Cancer Father         Multiple myloma    Cancer Maternal Grandfather         Brain Cancer    Cancer Maternal Grandmother         Colon cancer    Dementia Maternal Grandmother     Cancer Paternal Grandmother         Uterine cancer    Diabetes Paternal Grandmother         Type 2    Breast Cancer Paternal Aunt     Breast Cancer Paternal Aunt     Hypertension Maternal Aunt     Hyperlipidemia Maternal Aunt     Autism Son     Autism Brother         Adopted but still biologically related    Drug abuse Brother     Alcohol abuse Brother        Social History     Socioeconomic History    Marital status:      Spouse name: Not on file    Number of children: Not on file    Years of education: Not on file    Highest education level: Not on file   Occupational History    Not on file   Tobacco Use    Smoking status: Never    Smokeless tobacco: Never   Vaping Use    Vaping status:  Never Used   Substance and Sexual Activity    Alcohol use: Never    Drug use: Never    Sexual activity: Not on file   Other Topics Concern    Not on file   Social History Narrative    Not on file     Social Drivers of Health     Financial Resource Strain: Not on file   Food Insecurity: Unknown (2020)    Hunger Vital Sign     Worried About Running Out of Food in the Last Year: Patient declined     Ran Out of Food in the Last Year: Patient declined   Transportation Needs: Unknown (2020)    PRAPARE - Transportation     Lack of Transportation (Medical): Patient declined     Lack of Transportation (Non-Medical): Patient declined   Physical Activity: Not on file   Stress: Not on file   Social Connections: Not on file   Intimate Partner Violence: Not on file   Housing Stability: Not on file       Past Surgical History:   Procedure Laterality Date    FL CYSTOSCOPY,INSERT URETERAL STENT Right 2025    Procedure: CYSTOSCOPY, RIGHT URETEROSCOPY, LASER LITHOTRIPSY, RIGHT URETERIC STENT PLACEMENT;  Surgeon: Melchor Florez M.D.;  Location: SURGERY [facility];  Service: Urology    FL  DELIVERY ONLY  2020    Procedure:  SECTION, PRIMARY;  Surgeon: Gareth Bolden M.D.;  Location: LABOR AND DELIVERY;  Service: Labor and Delivery    TONSILLECTOMY AND ADENOIDECTOMY         Past Medical History:   Diagnosis Date    Renal disorder 2025    Kidney stones.       Current Outpatient Medications   Medication Sig Dispense Refill    clomiPHENE (CLOMID) 50 MG tablet Take 50 mg by mouth every day.      acetaminophen (TYLENOL) 500 MG Tab Take 500-1,000 mg by mouth every 6 hours as needed.      ibuprofen (MOTRIN) 200 MG Tab Take 200 mg by mouth every 6 hours as needed.      naproxen (ALEVE) 220 MG tablet Take 220 mg by mouth 2 times a day with meals.      tamsulosin (FLOMAX) 0.4 MG capsule Take 1 Capsule by mouth at bedtime. 30 Capsule 1    Prenatal Vit-Fe Fumarate-FA (PRENATAL PO) Take  by mouth.        No current facility-administered medications for this visit.       Allergies   Allergen Reactions    Food Diarrhea     PORK  Cramping    Lactose      Lactose intolerance  GI symptoms       Objective  There were no vitals taken for this visit.  Physical Exam  Constitutional:       Appearance: Normal appearance.   HENT:      Head: Normocephalic and atraumatic.   Pulmonary:      Effort: Pulmonary effort is normal.   Skin:     General: Skin is warm and dry.   Neurological:      General: No focal deficit present.      Mental Status: She is alert.   Psychiatric:         Mood and Affect: Mood normal.         Behavior: Behavior normal.         Imaging:   US RENAL   US-RENAL 04/03/2025    Narrative  4/3/2025 1:37 PM    HISTORY/REASON FOR EXAM:  Calculus      TECHNIQUE/EXAM DESCRIPTION:  Renal ultrasound.    COMPARISON:  1/20/2025    FINDINGS:    The right kidney measures 9.83 cm.  The right kidney appears normal in contour  No right hydronephrosis      The left kidney measures 10.53 cm. The left kidney appears normal in contour  There is a 2.3 x 1.4 x 0.8 cm shadowing calcification within the left kidney consistent with nonobstructing stone.  No left hydronephrosis.      The bladder demonstrates no focal wall abnormality.    Bilateral ureteral jets within the urinary bladder.    Estimated prevoid urine volume within the urinary bladder 85.7 mL  Estimated postvoid urine volume within the urinary bladder 9.1 mL    Impression  1.  0.8 x 2.3 cm nonobstructing stone left kidney.    2.  No hydronephrosis      Assessment    ASSESSMENT AND PLAN:    Patient with history of right kidney stone, now status post right ureteroscopy with laser lithotripsy and ureteral stent placement. Now s/p stent removal, and incidental left kidney calcification.    1. Right kidney stone, status post stent removal (N20.0)    - Assessment: Doing well post-stent removal with no current symptoms.  - Plan: Follow up as needed. No immediate intervention  required.  - Counseling: Discussed 50% chance of stone recurrence within 2 years. Advised preventive measures includin-3 liters of water daily   Daily glass of lemon water to alter urinary pH   Adequate calcium intake (1000-1200mg daily)   Avoid high-dose vitamin C and vitamin D supplements   Limit meat intake to once daily   Consider 1-2 vegetarian days weekly   Limit spinach to 1-2 times weekly   Reduce salt intake    2. Left kidney calcification (N20.0)    - Assessment: Likely parenchymal calcification from previous scarring/infection rather than a stone in the collecting system.  - Plan: Observation. No immediate intervention needed as asymptomatic.  - Counseling: Discussed option of CT urogram for further evaluation if symptoms develop. Explained PCNL procedure as potential future treatment option if needed due to favorable location of calcification.    Plan    Problem List Items Addressed This Visit    None  ORDERS:    No orders.    FOLLOW UP:    Follow up as needed.    SHORT SUMMARY:    Patient doing well post right kidney stent removal with no symptoms. Left kidney calcification appears to be parenchymal and asymptomatic, requiring no immediate intervention. Provided stone prevention counseling.

## 2025-05-29 ENCOUNTER — OFFICE VISIT (OUTPATIENT)
Dept: URGENT CARE | Facility: PHYSICIAN GROUP | Age: 30
End: 2025-05-29
Payer: COMMERCIAL

## 2025-05-29 ENCOUNTER — TELEPHONE (OUTPATIENT)
Dept: UROLOGY | Facility: MEDICAL CENTER | Age: 30
End: 2025-05-29

## 2025-05-29 VITALS
DIASTOLIC BLOOD PRESSURE: 82 MMHG | HEIGHT: 65 IN | RESPIRATION RATE: 14 BRPM | BODY MASS INDEX: 37.15 KG/M2 | WEIGHT: 223 LBS | HEART RATE: 90 BPM | SYSTOLIC BLOOD PRESSURE: 114 MMHG | TEMPERATURE: 97.2 F | OXYGEN SATURATION: 98 %

## 2025-05-29 DIAGNOSIS — N30.01 ACUTE CYSTITIS WITH HEMATURIA: ICD-10-CM

## 2025-05-29 DIAGNOSIS — R39.9 UTI SYMPTOMS: ICD-10-CM

## 2025-05-29 LAB
APPEARANCE UR: NORMAL
BILIRUB UR STRIP-MCNC: NEGATIVE MG/DL
COLOR UR AUTO: YELLOW
GLUCOSE UR STRIP.AUTO-MCNC: NEGATIVE MG/DL
KETONES UR STRIP.AUTO-MCNC: NEGATIVE MG/DL
LEUKOCYTE ESTERASE UR QL STRIP.AUTO: NORMAL
NITRITE UR QL STRIP.AUTO: NEGATIVE
PH UR STRIP.AUTO: 6 [PH] (ref 5–8)
POCT INT CON NEG: NEGATIVE
POCT INT CON POS: POSITIVE
POCT URINE PREGNANCY TEST: NEGATIVE
PROT UR QL STRIP: NORMAL MG/DL
RBC UR QL AUTO: NORMAL
SP GR UR STRIP.AUTO: >=1.03
UROBILINOGEN UR STRIP-MCNC: 0.2 MG/DL

## 2025-05-29 PROCEDURE — 99214 OFFICE O/P EST MOD 30 MIN: CPT | Performed by: PHYSICIAN ASSISTANT

## 2025-05-29 PROCEDURE — 3074F SYST BP LT 130 MM HG: CPT | Performed by: PHYSICIAN ASSISTANT

## 2025-05-29 PROCEDURE — 81025 URINE PREGNANCY TEST: CPT | Performed by: PHYSICIAN ASSISTANT

## 2025-05-29 PROCEDURE — 81002 URINALYSIS NONAUTO W/O SCOPE: CPT | Performed by: PHYSICIAN ASSISTANT

## 2025-05-29 PROCEDURE — 3079F DIAST BP 80-89 MM HG: CPT | Performed by: PHYSICIAN ASSISTANT

## 2025-05-29 RX ORDER — NITROFURANTOIN 25; 75 MG/1; MG/1
100 CAPSULE ORAL EVERY 12 HOURS
Qty: 10 CAPSULE | Refills: 0 | Status: SHIPPED | OUTPATIENT
Start: 2025-05-29 | End: 2025-06-03

## 2025-05-29 NOTE — TELEPHONE ENCOUNTER
VOICEMAIL  1. Caller Name: Komal Carranza                      Call Back Number: [phone]    2. Message: pt called and lm stating she was having some UTI symptoms and was not sure if we could get her in or if she should go to urgent care. Pt was seen at  and was given abx.    3. Patient approves office to leave a detailed voicemail/MyChart message: yes

## 2025-06-03 ASSESSMENT — ENCOUNTER SYMPTOMS
DIARRHEA: 0
ABDOMINAL PAIN: 0
NAUSEA: 0
RESPIRATORY NEGATIVE: 1
VOMITING: 0
CONSTITUTIONAL NEGATIVE: 1
FLANK PAIN: 0
SWEATS: 0
CHILLS: 0
NEUROLOGICAL NEGATIVE: 1
CARDIOVASCULAR NEGATIVE: 1

## 2025-06-03 NOTE — PROGRESS NOTES
Subjective     Komal Carranza is a 29 y.o. female who presents with UTI (C/o frequency / urgency, painful urination, blood in urine- is on period, Hx of UTIs, x 3 days. )            Dysuria   This is a new problem. The current episode started in the past 7 days. The problem occurs every urination. The problem has been unchanged. The quality of the pain is described as burning. There has been no fever. The fever has been present for Less than 1 day. There is No history of pyelonephritis. Associated symptoms include frequency, hematuria and urgency. Pertinent negatives include no chills, discharge, flank pain, hesitancy, nausea, sweats or vomiting. Her past medical history is significant for kidney stones, recurrent UTIs and a urological procedure.       PMH:  has a past medical history of Renal disorder (02/04/2025).  MEDS: Current Medications[1]  ALLERGIES: Allergies[2]  SURGHX: Past Surgical History[3]  SOCHX:  reports that she has never smoked. She has never used smokeless tobacco. She reports that she does not drink alcohol and does not use drugs.  FH: family history includes Alcohol abuse in her brother; Autism in her brother and son; Breast Cancer in her paternal aunt and paternal aunt; Cancer in her father, maternal grandfather, maternal grandmother, and paternal grandmother; Dementia in her maternal grandmother; Diabetes in her paternal grandmother; Drug abuse in her brother; Hyperlipidemia in her maternal aunt and mother; Hypertension in her maternal aunt and mother.      Review of Systems   Constitutional: Negative.  Negative for chills.   Respiratory: Negative.     Cardiovascular: Negative.    Gastrointestinal:  Negative for abdominal pain, diarrhea, nausea and vomiting.   Genitourinary:  Positive for dysuria, frequency, hematuria and urgency. Negative for flank pain and hesitancy.   Neurological: Negative.        Medications, Allergies, and current problem list reviewed today in Epic           Objective  "    /82 (BP Location: Right arm, Patient Position: Sitting, BP Cuff Size: Adult long)   Pulse 90   Temp 36.2 °C (97.2 °F) (Temporal)   Resp 14   Ht 1.651 m (5' 5\")   Wt 101 kg (222 lb 15.9 oz)   SpO2 98%   BMI 37.11 kg/m²      Physical Exam  Vitals and nursing note reviewed.   Constitutional:       General: She is not in acute distress.     Appearance: Normal appearance. She is not ill-appearing, toxic-appearing or diaphoretic.   HENT:      Head: Normocephalic and atraumatic.      Right Ear: External ear normal.      Left Ear: External ear normal.      Nose: Nose normal.   Eyes:      Conjunctiva/sclera: Conjunctivae normal.   Cardiovascular:      Rate and Rhythm: Normal rate and regular rhythm.      Heart sounds: Normal heart sounds.   Pulmonary:      Effort: Pulmonary effort is normal. No respiratory distress.      Breath sounds: Normal breath sounds. No wheezing, rhonchi or rales.   Abdominal:      General: Abdomen is flat. There is no distension.      Palpations: Abdomen is soft.      Tenderness: There is no abdominal tenderness. There is no right CVA tenderness, left CVA tenderness, guarding or rebound.      Comments: No suprapubic tenderness   Musculoskeletal:      Cervical back: Normal range of motion and neck supple.   Skin:     General: Skin is warm and dry.   Neurological:      General: No focal deficit present.      Mental Status: She is alert and oriented to person, place, and time. Mental status is at baseline.   Psychiatric:         Mood and Affect: Mood normal.         Behavior: Behavior normal.         Thought Content: Thought content normal.         Judgment: Judgment normal.                                  Assessment & Plan  UTI symptoms    Orders:    POCT Urinalysis    POCT PREGNANCY    Acute cystitis with hematuria  This is a very pleasant 29-year-old female presenting with 3 days of UTI symptoms including dysuria frequency urgency and hematuria.  History of recurrent UTI and " nephrolithiasis.  Denies fever, abdominal pain, flank pain or vomiting.  No vaginitis type symptoms.  Vital signs stable and exam reassuring.  Urinalysis with positive blood and leukocytes.  Based on symptoms presentation exam no overt signs of renal involvement.  Multiple recent urine cultures reviewed showing E. coli with pan-sensitivity.  We will treat with Macrobid, increase fluids and OTC meds.  She will follow-up with urology.  ER and red flag symptoms discussed at length.      Orders:    nitrofurantoin (MACROBID) 100 MG Cap; Take 1 Capsule by mouth every 12 hours for 5 days.           I personally reviewed prior external notes and test results pertinent to today's visit. Return to clinic or go to ED if symptoms worsen or persist. Red flag symptoms and indications for ED discussed at length. Patient/Parent/Guardian voices understanding.  AVS with post-visit instructions printed and provided or given verbally.  Follow-up with your primary care provider in 3-5 days. All side effects and potential interactions of prescribed medication discussed including allergic response, GI upset, tendon injury, rash, sedation, OCP effectiveness, etc.    Please note that this dictation was created using voice recognition software. I have made every reasonable attempt to correct obvious errors, but I expect that there are errors of grammar and possibly content that I did not discover before finalizing the note.             [1]   Current Outpatient Medications:     nitrofurantoin (MACROBID) 100 MG Cap, Take 1 Capsule by mouth every 12 hours for 5 days., Disp: 10 Capsule, Rfl: 0    acetaminophen (TYLENOL) 500 MG Tab, Take 500-1,000 mg by mouth every 6 hours as needed., Disp: , Rfl:     ibuprofen (MOTRIN) 200 MG Tab, Take 200 mg by mouth every 6 hours as needed., Disp: , Rfl:     naproxen (ALEVE) 220 MG tablet, Take 220 mg by mouth 2 times a day with meals., Disp: , Rfl:     Prenatal Vit-Fe Fumarate-FA (PRENATAL PO), Take  by  mouth., Disp: , Rfl:   [2]   Allergies  Allergen Reactions    Food Diarrhea     PORK  Cramping    Lactose      Lactose intolerance  GI symptoms   [3]   Past Surgical History:  Procedure Laterality Date    SD CYSTOSCOPY,INSERT URETERAL STENT Right 2025    Procedure: CYSTOSCOPY, RIGHT URETEROSCOPY, LASER LITHOTRIPSY, RIGHT URETERIC STENT PLACEMENT;  Surgeon: Melchor Florez M.D.;  Location: SURGERY [facility];  Service: Urology    SD  DELIVERY ONLY  2020    Procedure:  SECTION, PRIMARY;  Surgeon: Gareth Bolden M.D.;  Location: LABOR AND DELIVERY;  Service: Labor and Delivery    TONSILLECTOMY AND ADENOIDECTOMY

## 2025-06-06 ENCOUNTER — OFFICE VISIT (OUTPATIENT)
Dept: UROLOGY | Facility: MEDICAL CENTER | Age: 30
End: 2025-06-06
Payer: COMMERCIAL

## 2025-06-06 ENCOUNTER — HOSPITAL ENCOUNTER (OUTPATIENT)
Facility: MEDICAL CENTER | Age: 30
End: 2025-06-06
Attending: STUDENT IN AN ORGANIZED HEALTH CARE EDUCATION/TRAINING PROGRAM
Payer: COMMERCIAL

## 2025-06-06 ENCOUNTER — PHARMACY VISIT (OUTPATIENT)
Dept: PHARMACY | Facility: MEDICAL CENTER | Age: 30
End: 2025-06-06
Payer: COMMERCIAL

## 2025-06-06 DIAGNOSIS — N39.0 URINARY TRACT INFECTION WITHOUT HEMATURIA, SITE UNSPECIFIED: ICD-10-CM

## 2025-06-06 DIAGNOSIS — N39.0 URINARY TRACT INFECTION WITHOUT HEMATURIA, SITE UNSPECIFIED: Primary | ICD-10-CM

## 2025-06-06 DIAGNOSIS — N20.0 RENAL CALCULI: ICD-10-CM

## 2025-06-06 LAB
APPEARANCE UR: NORMAL
BILIRUB UR STRIP-MCNC: NORMAL MG/DL
COLOR UR AUTO: YELLOW
GLUCOSE UR STRIP.AUTO-MCNC: NORMAL MG/DL
KETONES UR STRIP.AUTO-MCNC: NORMAL MG/DL
LEUKOCYTE ESTERASE UR QL STRIP.AUTO: NORMAL
NITRITE UR QL STRIP.AUTO: NORMAL
PH UR STRIP.AUTO: 5.5 [PH] (ref 5–8)
PROT UR QL STRIP: 100 MG/DL
RBC UR QL AUTO: NORMAL
SP GR UR STRIP.AUTO: >=1.03
UROBILINOGEN UR STRIP-MCNC: 0.2 MG/DL

## 2025-06-06 PROCEDURE — 87086 URINE CULTURE/COLONY COUNT: CPT

## 2025-06-06 PROCEDURE — RXMED WILLOW AMBULATORY MEDICATION CHARGE: Performed by: STUDENT IN AN ORGANIZED HEALTH CARE EDUCATION/TRAINING PROGRAM

## 2025-06-06 PROCEDURE — 81002 URINALYSIS NONAUTO W/O SCOPE: CPT | Performed by: STUDENT IN AN ORGANIZED HEALTH CARE EDUCATION/TRAINING PROGRAM

## 2025-06-06 PROCEDURE — 99214 OFFICE O/P EST MOD 30 MIN: CPT | Performed by: STUDENT IN AN ORGANIZED HEALTH CARE EDUCATION/TRAINING PROGRAM

## 2025-06-06 RX ORDER — SULFAMETHOXAZOLE AND TRIMETHOPRIM 800; 160 MG/1; MG/1
1 TABLET ORAL 2 TIMES DAILY
Qty: 14 TABLET | Refills: 0 | Status: SHIPPED | OUTPATIENT
Start: 2025-06-06 | End: 2025-06-13

## 2025-06-06 RX ORDER — NITROFURANTOIN 25; 75 MG/1; MG/1
100 CAPSULE ORAL
Qty: 30 CAPSULE | Refills: 2 | Status: SHIPPED | OUTPATIENT
Start: 2025-06-06 | End: 2025-09-04

## 2025-06-06 NOTE — PROGRESS NOTES
Subjective  CHIEF COMPLAINT:    Patient presents to the office today to discuss:    1. Recurrent urinary tract infection symptoms    PAST UROLOGICAL HISTORY:    Patient has had 4-5 urinary tract infections in the past year, with symptoms now clustering together. Previous urine analysis did not include culture. Patient recently completed a course of antibiotics for UTI but symptoms have returned. Patient also reports history of yeast infection during pregnancy that required vaginal treatment.    HPI TODAY 06/06/2025:    - Patient reports completing antibiotic treatment for UTI with last dose taken Tuesday or Wednesday (06/03/2025 or 06/04/2025), but symptoms returning after tapering off medication.  - Current symptoms include urinary frequency, urgency, and pain. Reports keeping bladder full helps with pain management. No pain medication needed yet. Also notes possible right-sided kidney pain.  - Reports blood in urine during recent menstrual period but believes blood was also present in urine. Notes symptoms improved while on antibiotics but returning now that treatment has ended.  - Urine analysis today appears normal. No culture was performed with previous urine test.    Family History   Problem Relation Age of Onset    Hypertension Mother     Hyperlipidemia Mother     Cancer Father         Multiple myloma    Cancer Maternal Grandfather         Brain Cancer    Cancer Maternal Grandmother         Colon cancer    Dementia Maternal Grandmother     Cancer Paternal Grandmother         Uterine cancer    Diabetes Paternal Grandmother         Type 2    Breast Cancer Paternal Aunt     Breast Cancer Paternal Aunt     Hypertension Maternal Aunt     Hyperlipidemia Maternal Aunt     Autism Son     Autism Brother         Adopted but still biologically related    Drug abuse Brother     Alcohol abuse Brother        Social History     Socioeconomic History    Marital status:      Spouse name: Not on file    Number of  children: Not on file    Years of education: Not on file    Highest education level: Not on file   Occupational History    Not on file   Tobacco Use    Smoking status: Never    Smokeless tobacco: Never   Vaping Use    Vaping status: Never Used   Substance and Sexual Activity    Alcohol use: Never    Drug use: Never    Sexual activity: Not on file   Other Topics Concern    Not on file   Social History Narrative    Not on file     Social Drivers of Health     Financial Resource Strain: Not on file   Food Insecurity: Unknown (2020)    Hunger Vital Sign     Worried About Running Out of Food in the Last Year: Patient declined     Ran Out of Food in the Last Year: Patient declined   Transportation Needs: Unknown (2020)    PRAPARE - Transportation     Lack of Transportation (Medical): Patient declined     Lack of Transportation (Non-Medical): Patient declined   Physical Activity: Not on file   Stress: Not on file   Social Connections: Not on file   Intimate Partner Violence: Not on file   Housing Stability: Not on file       Past Surgical History:   Procedure Laterality Date    DC CYSTOSCOPY,INSERT URETERAL STENT Right 2025    Procedure: CYSTOSCOPY, RIGHT URETEROSCOPY, LASER LITHOTRIPSY, RIGHT URETERIC STENT PLACEMENT;  Surgeon: Melchor Florez M.D.;  Location: SURGERY [facility];  Service: Urology    DC  DELIVERY ONLY  2020    Procedure:  SECTION, PRIMARY;  Surgeon: Gareth Bolden M.D.;  Location: LABOR AND DELIVERY;  Service: Labor and Delivery    TONSILLECTOMY AND ADENOIDECTOMY         Past Medical History:   Diagnosis Date    Renal disorder 2025    Kidney stones.       Current Outpatient Medications   Medication Sig Dispense Refill    sulfamethoxazole-trimethoprim (BACTRIM DS) 800-160 MG tablet Take 1 Tablet by mouth 2 times a day for 7 days. 14 Tablet 0    nitrofurantoin (MACROBID) 100 MG Cap Take 1 Capsule by mouth 1 time a day as needed (take day one of period or  within two hours of sexual activity) for up to 90 days. 30 Capsule 2    acetaminophen (TYLENOL) 500 MG Tab Take 500-1,000 mg by mouth every 6 hours as needed.      ibuprofen (MOTRIN) 200 MG Tab Take 200 mg by mouth every 6 hours as needed.      naproxen (ALEVE) 220 MG tablet Take 220 mg by mouth 2 times a day with meals.      Prenatal Vit-Fe Fumarate-FA (PRENATAL PO) Take  by mouth.       No current facility-administered medications for this visit.       Allergies   Allergen Reactions    Food Diarrhea     PORK  Cramping    Lactose      Lactose intolerance  GI symptoms       Objective  There were no vitals taken for this visit.  Physical Exam  Constitutional:       Appearance: Normal appearance.   HENT:      Head: Normocephalic and atraumatic.   Pulmonary:      Effort: Pulmonary effort is normal.   Abdominal:      General: Abdomen is flat. There is no distension.      Palpations: Abdomen is soft.      Tenderness: There is no abdominal tenderness. There is no right CVA tenderness or left CVA tenderness.   Skin:     General: Skin is warm and dry.   Neurological:      General: No focal deficit present.      Mental Status: She is alert.   Psychiatric:         Mood and Affect: Mood normal.         Behavior: Behavior normal.         Labs:   POCT UA   Lab Results   Component Value Date/Time    POCCOLOR yellow 06/06/2025 02:11 AM    POCAPPEAR neg 06/06/2025 02:11 AM    POCLEUKEST trace 06/06/2025 02:11 AM    POCNITRITE neg 06/06/2025 02:11 AM    POCUROBILIGE 0.2 06/06/2025 02:11 AM    POCPROTEIN 100 06/06/2025 02:11 AM    POCURPH 5.5 06/06/2025 02:11 AM    POCBLOOD moderate 06/06/2025 02:11 AM    POCSPGRV >=1.030 06/06/2025 02:11 AM    POCKETONES neg 06/06/2025 02:11 AM    POCBILIRUBIN neg 06/06/2025 02:11 AM    POCGLUCUA neg 06/06/2025 02:11 AM            Imaging: none    Assessment    1. Urinary Tract Infection, recurrent (N39.0)    - Assessment: Recurrent UTI symptoms despite recent antibiotic treatment. Current UA  appears normal but symptoms persist.  - Plan:  1. Bactrim for 7 days  2. Macrobid prescription provided for future use as needed  3. Stat renal ultrasound to rule out stones or other pathology  4. Urine culture and PCR testing for atypical bacteria  5. Consider prophylactic nitrofurantoin at start of menstrual cycle    - Counseling: Discussed importance of hydration (2-3 liters daily), recommended GennaMD cranberry supplement by [employer] as more effective than juice. Discussed possible connection between hormonal changes during menstrual cycle and UTI risk. Advised to seek emergency care for worsening flank pain, fever, or chills.    Plan    Problem List Items Addressed This Visit    None  Visit Diagnoses         Urinary tract infection without hematuria, site unspecified    -  Primary    Relevant Orders    POCT Urinalysis (Completed)    URINE CULTURE(NEW)    US-RENAL        ORDERS:    Bactrim DS BID x 7 days  Macrobid for prophylactic use  Renal ultrasound (stat)  Urine culture    FOLLOW UP:    Follow up in 2 months to assess if interventions are breaking the cycle of recurrent UTIs.    SHORT SUMMARY:    Woman with recurrent UTIs presenting with symptoms returning after recent antibiotic course; started on Bactrim with stat renal ultrasound ordered and prophylactic strategy implemented to prevent future infections.

## 2025-06-09 ENCOUNTER — RESULTS FOLLOW-UP (OUTPATIENT)
Dept: UROLOGY | Facility: MEDICAL CENTER | Age: 30
End: 2025-06-09

## 2025-06-09 ENCOUNTER — HOSPITAL ENCOUNTER (OUTPATIENT)
Dept: RADIOLOGY | Facility: MEDICAL CENTER | Age: 30
End: 2025-06-09
Attending: STUDENT IN AN ORGANIZED HEALTH CARE EDUCATION/TRAINING PROGRAM
Payer: COMMERCIAL

## 2025-06-09 DIAGNOSIS — N39.0 URINARY TRACT INFECTION WITHOUT HEMATURIA, SITE UNSPECIFIED: ICD-10-CM

## 2025-06-09 LAB
BACTERIA UR CULT: NORMAL
SIGNIFICANT IND 70042: NORMAL
SITE SITE: NORMAL
SOURCE SOURCE: NORMAL

## 2025-06-09 PROCEDURE — 76775 US EXAM ABDO BACK WALL LIM: CPT

## 2025-06-19 DIAGNOSIS — N20.0 RENAL CALCULI: Primary | ICD-10-CM

## 2025-08-13 ENCOUNTER — OFFICE VISIT (OUTPATIENT)
Dept: UROLOGY | Facility: MEDICAL CENTER | Age: 30
End: 2025-08-13
Payer: COMMERCIAL

## 2025-08-13 DIAGNOSIS — N39.41 URGE URINARY INCONTINENCE: ICD-10-CM

## 2025-08-13 DIAGNOSIS — N32.81 OAB (OVERACTIVE BLADDER): Primary | ICD-10-CM

## 2025-08-13 DIAGNOSIS — R30.0 DYSURIA: ICD-10-CM

## 2025-08-13 LAB
POC POST-VOID: 162 ML
POC PRE-VOID: NORMAL

## 2025-08-13 PROCEDURE — 51798 US URINE CAPACITY MEASURE: CPT | Performed by: STUDENT IN AN ORGANIZED HEALTH CARE EDUCATION/TRAINING PROGRAM

## 2025-08-13 PROCEDURE — 99214 OFFICE O/P EST MOD 30 MIN: CPT | Mod: 25 | Performed by: STUDENT IN AN ORGANIZED HEALTH CARE EDUCATION/TRAINING PROGRAM

## 2025-08-13 RX ORDER — MIRABEGRON 25 MG/1
25 TABLET, FILM COATED, EXTENDED RELEASE ORAL DAILY
Qty: 90 TABLET | Refills: 3 | Status: SHIPPED | OUTPATIENT
Start: 2025-08-13

## 2025-08-14 ENCOUNTER — TELEPHONE (OUTPATIENT)
Dept: UROLOGY | Facility: MEDICAL CENTER | Age: 30
End: 2025-08-14
Payer: COMMERCIAL

## 2025-08-14 DIAGNOSIS — N39.0 RECURRENT UTI: Primary | ICD-10-CM

## 2025-09-03 ENCOUNTER — APPOINTMENT (OUTPATIENT)
Dept: UROLOGY | Facility: MEDICAL CENTER | Age: 30
End: 2025-09-03
Payer: COMMERCIAL

## (undated) DEVICE — SET IRRIGATION CYSTOSCOPY Y-TYPE L81 IN (20EA/CA)

## (undated) DEVICE — DETERGENT RENUZYME PLUS 10 OZ PACKET (50/BX)

## (undated) DEVICE — TRAY SPINAL ANESTHESIA NON-SAFETY (10/CA)

## (undated) DEVICE — CONTAINER SPECIMEN BAG OR - STERILE 4 OZ W/LID (100EA/CA)

## (undated) DEVICE — GOWN WARMING STANDARD FLEX - (30/CA)

## (undated) DEVICE — KIT  I.V. START (100EA/CA)

## (undated) DEVICE — JELLY SURGILUBE STERILE TUBE 4.25 OZ (1/EA)

## (undated) DEVICE — COVER LIGHT HANDLE FLEXIBLE - SOFT (2EA/PK 80PK/CA)

## (undated) DEVICE — SODIUM CHL IRRIGATION 0.9% 1000ML (12EA/CA)

## (undated) DEVICE — CONNECTOR HOSE NEPTUNE FOR CYSTO ROOM

## (undated) DEVICE — COVER LIGHT HANDLE ALC PLUS DISP (18EA/BX)

## (undated) DEVICE — PACK ROOM TURNOVER L&D (12/CA)

## (undated) DEVICE — SUTURE 3-0 VICRYL PLUS CT-1 - 36 INCH (36/BX)

## (undated) DEVICE — WATER IRRIGATION STERILE 1000ML (12EA/CA)

## (undated) DEVICE — PACK CYSTOSCOPY III - (8/CA)

## (undated) DEVICE — GLOVE BIOGEL SZ 7.5 SURGICAL PF LTX - (50PR/BX 4BX/CA)

## (undated) DEVICE — SET EXTENSION WITH 2 PORTS (48EA/CA) ***PART #2C8610 IS A SUBSTITUTE*****

## (undated) DEVICE — SUTURE GENERAL

## (undated) DEVICE — TUBING CLEARLINK DUO-VENT - C-FLO (48EA/CA)

## (undated) DEVICE — SYRINGE STERILE 10 ML LL (200/BX)

## (undated) DEVICE — SPONGE GAUZESTER 4 X 4 4PLY - (128PK/CA)

## (undated) DEVICE — WATER IRRIG. STER 3000 ML - (4/CA)

## (undated) DEVICE — MEDICINE CUP STERILE 2 OZ - (100/CA)

## (undated) DEVICE — SUTURE 1 VICRYL PLUS CTX - 36 INCH (36/BX)

## (undated) DEVICE — HEAD HOLDER JUNIOR/ADULT

## (undated) DEVICE — TAPE CLOTH MEDIPORE 6 INCH - (12RL/CA)

## (undated) DEVICE — DRESSING POST OP BORDER 4 X 10 (5EA/BX)

## (undated) DEVICE — WIRE GUIDE SENSOR DUAL FLEX - 5/BX

## (undated) DEVICE — BLANKET UNDERBODY FULL ACCES - (5/CA)

## (undated) DEVICE — SET LEADWIRE 5 LEAD BEDSIDE DISPOSABLE ECG (1SET OF 5/EA)

## (undated) DEVICE — GOWN SURGICAL X-LARGE ULTRA - FILM-REINFORCED (20/CA)

## (undated) DEVICE — BAG SPONGE COUNT 10.25 X 32 - BLUE (250/CA)

## (undated) DEVICE — GLOVE BIOGEL SZ 6.5 SURGICAL PF LTX (50PR/BX 4BX/CA)

## (undated) DEVICE — SUCTION INSTRUMENT YANKAUER BULBOUS TIP W/O VENT (50EA/CA)

## (undated) DEVICE — SLEEVE, SEQUENTIAL CALF REG

## (undated) DEVICE — SENSOR OXIMETER ADULT SPO2 RD SET (20EA/BX)

## (undated) DEVICE — SUTURE 2-0 CHROMIC GUT CT-1 27 (36PK/BX)"

## (undated) DEVICE — CANISTER SUCTION 3000ML MECHANICAL FILTER AUTO SHUTOFF MEDI-VAC NONSTERILE LF DISP  (40EA/CA)

## (undated) DEVICE — SLEEVE, VASO, THIGH, MED

## (undated) DEVICE — COVER FOOT UNIVERSAL DISP. - (25EA/CA)

## (undated) DEVICE — PAD GROUNDING PRE-JELLED - (50EA/PK)

## (undated) DEVICE — PACK C-SECTION (2EA/CA)

## (undated) DEVICE — BASKET ZERO 1.9FR X 120CM

## (undated) DEVICE — SUTURE 3-0 MONOCRYL PLUS PS-1 - 27 INCH (36/BX)

## (undated) DEVICE — GLOVE BIOGEL SZ 7 SURGICAL PF LTX - (50PR/BX 4BX/CA)

## (undated) DEVICE — CANISTER SUCTION 3000ML MECHANICAL FILTER AUTO SHUTOFF MEDI-VAC NONSTERILE LF DISP (40EA/CA)

## (undated) DEVICE — SUTURE 1 CHROMIC CTX ETHICON - (36PK/BX)

## (undated) DEVICE — GOWN SURGEONS X-LARGE - DISP. (30/CA)

## (undated) DEVICE — CATHETER IV NON-SAFETY 18 GA X 1 1/4 (50/BX 4BX/CA)

## (undated) DEVICE — LACTATED RINGERS INJ 1000 ML - (14EA/CA 60CA/PF)

## (undated) DEVICE — SODIUM CHL. IRRIGATION 0.9% 3000ML (4EA/CA 65CA/PF)

## (undated) DEVICE — BAG DRAINAGE LINGEMAN CYSTO FOR GE/OEC 2600/2800 TABLES (20EA/CA)

## (undated) DEVICE — JELLY SURGILUBE STERILE FOIL 5 GM (150EA/BX)

## (undated) DEVICE — CHLORAPREP 26 ML APPLICATOR - ORANGE TINT(25/CA)